# Patient Record
Sex: FEMALE | Race: WHITE | URBAN - METROPOLITAN AREA
[De-identification: names, ages, dates, MRNs, and addresses within clinical notes are randomized per-mention and may not be internally consistent; named-entity substitution may affect disease eponyms.]

---

## 2017-03-01 ENCOUNTER — OUTPATIENT (OUTPATIENT)
Dept: OUTPATIENT SERVICES | Facility: HOSPITAL | Age: 77
LOS: 1 days | Discharge: HOME | End: 2017-03-01
Payer: MEDICARE

## 2017-03-01 DIAGNOSIS — I47.1 SUPRAVENTRICULAR TACHYCARDIA: ICD-10-CM

## 2017-03-01 DIAGNOSIS — Z87.891 PERSONAL HISTORY OF NICOTINE DEPENDENCE: ICD-10-CM

## 2017-03-01 DIAGNOSIS — I48.0 PAROXYSMAL ATRIAL FIBRILLATION: ICD-10-CM

## 2017-03-01 DIAGNOSIS — M23.361 OTHER MENISCUS DERANGEMENTS, OTHER LATERAL MENISCUS, RIGHT KNEE: ICD-10-CM

## 2017-03-01 DIAGNOSIS — S82.141A DISPLACED BICONDYLAR FRACTURE OF RIGHT TIBIA, INITIAL ENCOUNTER FOR CLOSED FRACTURE: ICD-10-CM

## 2017-03-01 DIAGNOSIS — Z98.890 OTHER SPECIFIED POSTPROCEDURAL STATES: Chronic | ICD-10-CM

## 2017-03-01 DIAGNOSIS — M84.451A PATHOLOGICAL FRACTURE, RIGHT FEMUR, INITIAL ENCOUNTER FOR FRACTURE: ICD-10-CM

## 2017-03-01 DIAGNOSIS — E89.2 POSTPROCEDURAL HYPOPARATHYROIDISM: Chronic | ICD-10-CM

## 2017-03-01 DIAGNOSIS — S83.241A OTHER TEAR OF MEDIAL MENISCUS, CURRENT INJURY, RIGHT KNEE, INITIAL ENCOUNTER: ICD-10-CM

## 2017-03-01 DIAGNOSIS — M84.461A PATHOLOGICAL FRACTURE, RIGHT TIBIA, INITIAL ENCOUNTER FOR FRACTURE: ICD-10-CM

## 2017-03-01 DIAGNOSIS — Z90.49 ACQUIRED ABSENCE OF OTHER SPECIFIED PARTS OF DIGESTIVE TRACT: Chronic | ICD-10-CM

## 2017-03-01 DIAGNOSIS — Z01.818 ENCOUNTER FOR OTHER PREPROCEDURAL EXAMINATION: ICD-10-CM

## 2017-03-01 DIAGNOSIS — M23.321 OTHER MENISCUS DERANGEMENTS, POSTERIOR HORN OF MEDIAL MENISCUS, RIGHT KNEE: ICD-10-CM

## 2017-03-01 DIAGNOSIS — E89.0 POSTPROCEDURAL HYPOTHYROIDISM: Chronic | ICD-10-CM

## 2017-03-01 LAB
ANION GAP SERPL CALC-SCNC: 10 MMOL/L — SIGNIFICANT CHANGE UP (ref 5–17)
BASOPHILS # BLD AUTO: 0.1 K/UL — SIGNIFICANT CHANGE UP (ref 0–0.2)
BASOPHILS NFR BLD AUTO: 1.9 % — SIGNIFICANT CHANGE UP (ref 0–2)
BUN SERPL-MCNC: 22 MG/DL — SIGNIFICANT CHANGE UP (ref 7–23)
CALCIUM SERPL-MCNC: 8.4 MG/DL — LOW (ref 8.5–10.1)
CHLORIDE SERPL-SCNC: 108 MMOL/L — SIGNIFICANT CHANGE UP (ref 96–108)
CO2 SERPL-SCNC: 25 MMOL/L — SIGNIFICANT CHANGE UP (ref 22–31)
CREAT SERPL-MCNC: 1 MG/DL — SIGNIFICANT CHANGE UP (ref 0.5–1.3)
EOSINOPHIL # BLD AUTO: 0.4 K/UL — SIGNIFICANT CHANGE UP (ref 0–0.5)
EOSINOPHIL NFR BLD AUTO: 6.7 % — HIGH (ref 0–6)
GLUCOSE SERPL-MCNC: 93 MG/DL — SIGNIFICANT CHANGE UP (ref 70–99)
HCT VFR BLD CALC: 39.4 % — SIGNIFICANT CHANGE UP (ref 34.5–45)
HGB BLD-MCNC: 13.4 G/DL — SIGNIFICANT CHANGE UP (ref 11.5–15.5)
LYMPHOCYTES # BLD AUTO: 1.5 K/UL — SIGNIFICANT CHANGE UP (ref 1–3.3)
LYMPHOCYTES # BLD AUTO: 27.9 % — SIGNIFICANT CHANGE UP (ref 13–44)
MCHC RBC-ENTMCNC: 31.2 PG — SIGNIFICANT CHANGE UP (ref 27–34)
MCHC RBC-ENTMCNC: 34 GM/DL — SIGNIFICANT CHANGE UP (ref 32–36)
MCV RBC AUTO: 91.5 FL — SIGNIFICANT CHANGE UP (ref 80–100)
MONOCYTES # BLD AUTO: 0.5 K/UL — SIGNIFICANT CHANGE UP (ref 0–0.9)
MONOCYTES NFR BLD AUTO: 9.6 % — SIGNIFICANT CHANGE UP (ref 2–14)
NEUTROPHILS # BLD AUTO: 3 K/UL — SIGNIFICANT CHANGE UP (ref 1.8–7.4)
NEUTROPHILS NFR BLD AUTO: 53.9 % — SIGNIFICANT CHANGE UP (ref 43–77)
PLATELET # BLD AUTO: 242 K/UL — SIGNIFICANT CHANGE UP (ref 150–400)
POTASSIUM SERPL-MCNC: 4.6 MMOL/L — SIGNIFICANT CHANGE UP (ref 3.5–5.3)
POTASSIUM SERPL-SCNC: 4.6 MMOL/L — SIGNIFICANT CHANGE UP (ref 3.5–5.3)
RBC # BLD: 4.31 M/UL — SIGNIFICANT CHANGE UP (ref 3.8–5.2)
RBC # FLD: 11.5 % — SIGNIFICANT CHANGE UP (ref 10.3–14.5)
SODIUM SERPL-SCNC: 143 MMOL/L — SIGNIFICANT CHANGE UP (ref 135–145)
WBC # BLD: 5.5 K/UL — SIGNIFICANT CHANGE UP (ref 3.8–10.5)
WBC # FLD AUTO: 5.5 K/UL — SIGNIFICANT CHANGE UP (ref 3.8–10.5)

## 2017-03-01 PROCEDURE — 93010 ELECTROCARDIOGRAM REPORT: CPT

## 2017-03-01 NOTE — ASU PATIENT PROFILE, ADULT - ABILITY TO HEAR (WITH HEARING AID OR HEARING APPLIANCE IF NORMALLY USED):
hearing loss left ear-does not use assistive device./Mildly to Moderately Impaired: difficulty hearing in some environments or speaker may need to increase volume or speak distinctly

## 2017-03-01 NOTE — ASU PATIENT PROFILE, ADULT - VISION (WITH CORRECTIVE LENSES IF THE PATIENT USUALLY WEARS THEM):
Partially impaired: cannot see medication labels or newsprint, but can see obstacles in path, and the surrounding layout; can count fingers at arm's length/reading glasses and far sighted

## 2017-03-01 NOTE — ASU PATIENT PROFILE, ADULT - PSH
S/P arthroscopy of left knee  2016  S/P cholecystectomy  2015  S/P colonoscopy  unsure of all dates; last one 2016  S/P parathyroidectomy  12/2005  S/P thyroidectomy  12/2005

## 2017-03-01 NOTE — CHART NOTE - NSCHARTNOTEFT_GEN_A_CORE
Patient seen in PST encounter today:    V/S: T-98.0, P-67, R-14, B/P-142/75, o2sat 99% on room air.    Ht: 5'2", Wt: 69.1kgs    EZ sponges, holistic sheet, and day of procedure instructions provided and reviewed with patient.

## 2017-03-01 NOTE — ASU PATIENT PROFILE, ADULT - PMH
Cardiac arrhythmia  H/O: bigemeny and trigemeny  Fracture of right lower extremity  condyle and tibial plateau fracture  Hearing loss of right ear, unspecified hearing loss type    Hypothyroidism, acquired  s/p thyroidectomy  Knee pain, right    Osteoporosis    SVT (supraventricular tachycardia)  H/O  Thyroid cancer  s/p thyroidectomy

## 2017-03-09 ENCOUNTER — OUTPATIENT (OUTPATIENT)
Dept: OUTPATIENT SERVICES | Facility: HOSPITAL | Age: 77
LOS: 1 days | Discharge: HOME | End: 2017-03-09
Payer: MEDICARE

## 2017-03-09 VITALS
HEART RATE: 64 BPM | WEIGHT: 148.37 LBS | RESPIRATION RATE: 16 BRPM | TEMPERATURE: 98 F | SYSTOLIC BLOOD PRESSURE: 130 MMHG | HEIGHT: 62 IN | DIASTOLIC BLOOD PRESSURE: 75 MMHG | OXYGEN SATURATION: 97 %

## 2017-03-09 VITALS
DIASTOLIC BLOOD PRESSURE: 70 MMHG | RESPIRATION RATE: 16 BRPM | HEART RATE: 70 BPM | SYSTOLIC BLOOD PRESSURE: 143 MMHG | OXYGEN SATURATION: 95 %

## 2017-03-09 DIAGNOSIS — Z98.890 OTHER SPECIFIED POSTPROCEDURAL STATES: Chronic | ICD-10-CM

## 2017-03-09 DIAGNOSIS — Z90.49 ACQUIRED ABSENCE OF OTHER SPECIFIED PARTS OF DIGESTIVE TRACT: Chronic | ICD-10-CM

## 2017-03-09 DIAGNOSIS — E89.0 POSTPROCEDURAL HYPOTHYROIDISM: Chronic | ICD-10-CM

## 2017-03-09 DIAGNOSIS — E89.2 POSTPROCEDURAL HYPOPARATHYROIDISM: Chronic | ICD-10-CM

## 2017-03-09 PROCEDURE — 88304 TISSUE EXAM BY PATHOLOGIST: CPT | Mod: 26

## 2017-03-09 RX ORDER — IBUPROFEN 200 MG
400 TABLET ORAL EVERY 8 HOURS
Qty: 0 | Refills: 0 | Status: DISCONTINUED | OUTPATIENT
Start: 2017-03-09 | End: 2017-03-24

## 2017-03-09 RX ORDER — SODIUM CHLORIDE 9 MG/ML
1000 INJECTION INTRAMUSCULAR; INTRAVENOUS; SUBCUTANEOUS
Qty: 0 | Refills: 0 | Status: DISCONTINUED | OUTPATIENT
Start: 2017-03-09 | End: 2017-03-09

## 2017-03-09 RX ORDER — HYDROMORPHONE HYDROCHLORIDE 2 MG/ML
1 INJECTION INTRAMUSCULAR; INTRAVENOUS; SUBCUTANEOUS
Qty: 0 | Refills: 0 | Status: DISCONTINUED | OUTPATIENT
Start: 2017-03-09 | End: 2017-03-09

## 2017-03-09 RX ORDER — MORPHINE SULFATE 50 MG/1
4 CAPSULE, EXTENDED RELEASE ORAL EVERY 4 HOURS
Qty: 0 | Refills: 0 | Status: DISCONTINUED | OUTPATIENT
Start: 2017-03-09 | End: 2017-03-09

## 2017-03-09 RX ORDER — ASPIRIN/CALCIUM CARB/MAGNESIUM 324 MG
1 TABLET ORAL
Qty: 14 | Refills: 0
Start: 2017-03-09 | End: 2017-03-16

## 2017-03-09 RX ORDER — TOBRAMYCIN 0.3 %
1 DROPS OPHTHALMIC (EYE) EVERY 4 HOURS
Qty: 0 | Refills: 0 | Status: DISCONTINUED | OUTPATIENT
Start: 2017-03-09 | End: 2017-03-24

## 2017-03-09 RX ORDER — ONDANSETRON 8 MG/1
4 TABLET, FILM COATED ORAL EVERY 6 HOURS
Qty: 0 | Refills: 0 | Status: DISCONTINUED | OUTPATIENT
Start: 2017-03-09 | End: 2017-03-24

## 2017-03-09 RX ORDER — ACETAMINOPHEN 500 MG
1000 TABLET ORAL ONCE
Qty: 0 | Refills: 0 | Status: COMPLETED | OUTPATIENT
Start: 2017-03-09 | End: 2017-03-09

## 2017-03-09 RX ORDER — OXYCODONE HYDROCHLORIDE 5 MG/1
5 TABLET ORAL EVERY 6 HOURS
Qty: 0 | Refills: 0 | Status: DISCONTINUED | OUTPATIENT
Start: 2017-03-09 | End: 2017-03-09

## 2017-03-09 RX ADMIN — SODIUM CHLORIDE 75 MILLILITER(S): 9 INJECTION INTRAMUSCULAR; INTRAVENOUS; SUBCUTANEOUS at 11:46

## 2017-03-09 RX ADMIN — Medication 1 APPLICATION(S): at 13:45

## 2017-03-09 RX ADMIN — HYDROMORPHONE HYDROCHLORIDE 1 MILLIGRAM(S): 2 INJECTION INTRAMUSCULAR; INTRAVENOUS; SUBCUTANEOUS at 12:22

## 2017-03-09 RX ADMIN — Medication 400 MILLIGRAM(S): at 11:46

## 2017-03-09 NOTE — ASU DISCHARGE PLAN (ADULT/PEDIATRIC). - SPECIAL INSTRUCTIONS
please call office for follow up appointment; please rest ice and elevate affected knee; WBAT; keep dressing clean dry intact; please call office for follow up appointment; please rest ice and elevate affected knee; WBAT; keep dressing clean dry intact; Take Aspirin 325mg daily for 7 days for DVT PPx.

## 2017-03-09 NOTE — ASU PATIENT PROFILE, ADULT - PMH
Cardiac arrhythmia  H/O: bigemeny and trigemeny  Fracture of right lower extremity  condyle and tibial plateau fracture  Hearing loss of right ear, unspecified hearing loss type    Hypothyroidism, acquired  s/p thyroidectomy  Knee pain, right    Osteoporosis    Pancreatitis  2015  SVT (supraventricular tachycardia)  H/O  Thyroid cancer  s/p thyroidectomy

## 2017-03-09 NOTE — BRIEF OPERATIVE NOTE - PROCEDURE
Subchondroplasty of knee  03/09/2017    Active  ADELIA  Knee arthroscopy, right  03/09/2017    Active  ADELIA

## 2017-03-09 NOTE — ASU DISCHARGE PLAN (ADULT/PEDIATRIC). - NURSING INSTRUCTIONS
Begin with liquids and light food ( tea, toast, Jello, soups). Advance to what you normally eat. Liquids should taken in adequate amounts today.    CALL the DOCTOR:  Fever above 101F                                      Signs  of infection such as : increase pain,swelling,redness,or a bad  smell coming from                                        the wound.                                       Any pain that appears to be getting worse.                                       Vomiting                                        If you have  not urinated 8 hours after surgery or have any difficulty urinating.     A responsible adult should be with you for the rest of the day and night for your safety and to help you if you needed.    Review attached FACT SHEET if applicable.

## 2017-03-09 NOTE — ASU DISCHARGE PLAN (ADULT/PEDIATRIC). - MEDICATION SUMMARY - MEDICATIONS TO TAKE
I will START or STAY ON the medications listed below when I get home from the hospital:    aspirin 81 mg oral delayed release tablet  -- 1 tab(s) by mouth once a day  -- pt instructed to follow Dr. Tran or Dr. Philip pre-procedure instructions.  -- Indication: For home med/DVT PPx    Percocet 5/325 325 mg-5 mg oral tablet  -- 1 tab(s) by mouth every 4 hours, As Needed MDD:6 tabs for pain  -- Caution federal law prohibits the transfer of this drug to any person other  than the person for whom it was prescribed.  May cause drowsiness.  Alcohol may intensify this effect.  Use care when operating dangerous machinery.  This prescription cannot be refilled.  This product contains acetaminophen.  Do not use  with any other product containing acetaminophen to prevent possible liver damage.  Using more of this medication than prescribed may cause serious breathing problems.    -- Indication: For Pain    verapamil 120 mg/24 hours oral capsule, extended release  -- 1 cap(s) by mouth once a day  -- Indication: For home med    Evista 60 mg oral tablet  -- 1 tab(s) by mouth once a day  -- Indication: For home med    Synthroid 100 mcg (0.1 mg) oral tablet  -- 1 tab(s) by mouth once a day  -- Indication: For home med    Vitamin D3 2000 intl units oral capsule  -- 1 cap(s) by mouth once a day  -- pt instructed to stop 1 week prior to procedure.  -- Indication: For home med

## 2017-03-09 NOTE — ASU DISCHARGE PLAN (ADULT/PEDIATRIC). - NOTIFY
Bleeding that does not stop/Fever greater than 101/Numbness, color, or temperature change to extremity/Persistent Nausea and Vomiting/Pain not relieved by Medications/Swelling that continues

## 2017-03-09 NOTE — PROGRESS NOTE ADULT - ASSESSMENT
Pt with possible corneal abrassion. Tetracaine one drop given in right eye with immediate relief. Pt ordered for Tobramycin. Anesthesia team informed.

## 2017-03-13 DIAGNOSIS — M84.461A PATHOLOGICAL FRACTURE, RIGHT TIBIA, INITIAL ENCOUNTER FOR FRACTURE: ICD-10-CM

## 2017-03-13 DIAGNOSIS — Z85.850 PERSONAL HISTORY OF MALIGNANT NEOPLASM OF THYROID: ICD-10-CM

## 2017-03-13 DIAGNOSIS — Z87.891 PERSONAL HISTORY OF NICOTINE DEPENDENCE: ICD-10-CM

## 2017-03-13 DIAGNOSIS — I48.0 PAROXYSMAL ATRIAL FIBRILLATION: ICD-10-CM

## 2017-03-13 DIAGNOSIS — Z79.82 LONG TERM (CURRENT) USE OF ASPIRIN: ICD-10-CM

## 2017-03-13 DIAGNOSIS — M23.361 OTHER MENISCUS DERANGEMENTS, OTHER LATERAL MENISCUS, RIGHT KNEE: ICD-10-CM

## 2017-03-13 DIAGNOSIS — M84.451A PATHOLOGICAL FRACTURE, RIGHT FEMUR, INITIAL ENCOUNTER FOR FRACTURE: ICD-10-CM

## 2017-03-13 DIAGNOSIS — M23.321 OTHER MENISCUS DERANGEMENTS, POSTERIOR HORN OF MEDIAL MENISCUS, RIGHT KNEE: ICD-10-CM

## 2017-03-13 DIAGNOSIS — I35.1 NONRHEUMATIC AORTIC (VALVE) INSUFFICIENCY: ICD-10-CM

## 2017-03-13 DIAGNOSIS — Z86.718 PERSONAL HISTORY OF OTHER VENOUS THROMBOSIS AND EMBOLISM: ICD-10-CM

## 2017-03-13 DIAGNOSIS — I47.1 SUPRAVENTRICULAR TACHYCARDIA: ICD-10-CM

## 2018-07-16 ENCOUNTER — OUTPATIENT (OUTPATIENT)
Dept: OUTPATIENT SERVICES | Facility: HOSPITAL | Age: 78
LOS: 1 days | Discharge: HOME | End: 2018-07-16

## 2018-07-16 DIAGNOSIS — E89.2 POSTPROCEDURAL HYPOPARATHYROIDISM: Chronic | ICD-10-CM

## 2018-07-16 DIAGNOSIS — C50.919 MALIGNANT NEOPLASM OF UNSPECIFIED SITE OF UNSPECIFIED FEMALE BREAST: ICD-10-CM

## 2018-07-16 DIAGNOSIS — E89.0 POSTPROCEDURAL HYPOTHYROIDISM: Chronic | ICD-10-CM

## 2018-07-16 DIAGNOSIS — Z98.890 OTHER SPECIFIED POSTPROCEDURAL STATES: Chronic | ICD-10-CM

## 2018-07-16 DIAGNOSIS — Z90.49 ACQUIRED ABSENCE OF OTHER SPECIFIED PARTS OF DIGESTIVE TRACT: Chronic | ICD-10-CM

## 2018-07-16 PROBLEM — K85.90 ACUTE PANCREATITIS WITHOUT NECROSIS OR INFECTION, UNSPECIFIED: Chronic | Status: ACTIVE | Noted: 2017-03-09

## 2018-07-16 PROBLEM — E03.9 HYPOTHYROIDISM, UNSPECIFIED: Chronic | Status: ACTIVE | Noted: 2017-03-01

## 2018-07-16 PROBLEM — I49.9 CARDIAC ARRHYTHMIA, UNSPECIFIED: Chronic | Status: ACTIVE | Noted: 2017-03-01

## 2018-07-16 PROBLEM — M81.0 AGE-RELATED OSTEOPOROSIS WITHOUT CURRENT PATHOLOGICAL FRACTURE: Chronic | Status: ACTIVE | Noted: 2017-03-01

## 2018-07-16 PROBLEM — C73 MALIGNANT NEOPLASM OF THYROID GLAND: Chronic | Status: ACTIVE | Noted: 2017-03-01

## 2018-07-16 PROBLEM — M25.561 PAIN IN RIGHT KNEE: Chronic | Status: ACTIVE | Noted: 2017-03-01

## 2018-07-16 PROBLEM — H91.91 UNSPECIFIED HEARING LOSS, RIGHT EAR: Chronic | Status: ACTIVE | Noted: 2017-03-01

## 2018-07-16 PROBLEM — I47.1 SUPRAVENTRICULAR TACHYCARDIA: Chronic | Status: ACTIVE | Noted: 2017-03-01

## 2018-07-16 PROBLEM — S82.91XA UNSPECIFIED FRACTURE OF RIGHT LOWER LEG, INITIAL ENCOUNTER FOR CLOSED FRACTURE: Chronic | Status: ACTIVE | Noted: 2017-03-01

## 2018-09-06 ENCOUNTER — OUTPATIENT (OUTPATIENT)
Dept: OUTPATIENT SERVICES | Facility: HOSPITAL | Age: 78
LOS: 1 days | Discharge: HOME | End: 2018-09-06

## 2018-09-06 DIAGNOSIS — E89.2 POSTPROCEDURAL HYPOPARATHYROIDISM: Chronic | ICD-10-CM

## 2018-09-06 DIAGNOSIS — Z98.890 OTHER SPECIFIED POSTPROCEDURAL STATES: Chronic | ICD-10-CM

## 2018-09-06 DIAGNOSIS — C50.919 MALIGNANT NEOPLASM OF UNSPECIFIED SITE OF UNSPECIFIED FEMALE BREAST: ICD-10-CM

## 2018-09-06 DIAGNOSIS — Z90.49 ACQUIRED ABSENCE OF OTHER SPECIFIED PARTS OF DIGESTIVE TRACT: Chronic | ICD-10-CM

## 2018-09-06 DIAGNOSIS — E89.0 POSTPROCEDURAL HYPOTHYROIDISM: Chronic | ICD-10-CM

## 2019-06-02 ENCOUNTER — EMERGENCY (EMERGENCY)
Facility: HOSPITAL | Age: 79
LOS: 0 days | Discharge: HOME | End: 2019-06-02
Attending: EMERGENCY MEDICINE | Admitting: EMERGENCY MEDICINE
Payer: MEDICARE

## 2019-06-02 VITALS
OXYGEN SATURATION: 100 % | HEART RATE: 60 BPM | SYSTOLIC BLOOD PRESSURE: 153 MMHG | RESPIRATION RATE: 18 BRPM | DIASTOLIC BLOOD PRESSURE: 65 MMHG | TEMPERATURE: 98 F

## 2019-06-02 VITALS — HEIGHT: 62 IN | WEIGHT: 147.93 LBS

## 2019-06-02 DIAGNOSIS — Z98.890 OTHER SPECIFIED POSTPROCEDURAL STATES: Chronic | ICD-10-CM
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
## 2019-06-02 DIAGNOSIS — Z90.49 ACQUIRED ABSENCE OF OTHER SPECIFIED PARTS OF DIGESTIVE TRACT: Chronic | ICD-10-CM

## 2019-06-02 DIAGNOSIS — K85.90 ACUTE PANCREATITIS WITHOUT NECROSIS OR INFECTION, UNSPECIFIED: ICD-10-CM

## 2019-06-02 DIAGNOSIS — E89.0 POSTPROCEDURAL HYPOTHYROIDISM: Chronic | ICD-10-CM

## 2019-06-02 DIAGNOSIS — S52.92XA UNSPECIFIED FRACTURE OF LEFT FOREARM, INITIAL ENCOUNTER FOR CLOSED FRACTURE: ICD-10-CM
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
## 2019-06-02 DIAGNOSIS — M81.0 AGE-RELATED OSTEOPOROSIS WITHOUT CURRENT PATHOLOGICAL FRACTURE: ICD-10-CM

## 2019-06-02 DIAGNOSIS — Z79.899 OTHER LONG TERM (CURRENT) DRUG THERAPY: ICD-10-CM

## 2019-06-02 DIAGNOSIS — Z79.82 LONG TERM (CURRENT) USE OF ASPIRIN: ICD-10-CM
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
## 2019-06-02 DIAGNOSIS — S52.202A UNSPECIFIED FRACTURE OF SHAFT OF LEFT ULNA, INITIAL ENCOUNTER FOR CLOSED FRACTURE: ICD-10-CM

## 2019-06-02 DIAGNOSIS — E89.2 POSTPROCEDURAL HYPOPARATHYROIDISM: Chronic | ICD-10-CM
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
## 2019-06-02 DIAGNOSIS — M25.532 PAIN IN LEFT WRIST: ICD-10-CM

## 2019-06-02 PROCEDURE — 73130 X-RAY EXAM OF HAND: CPT | Mod: 26,LT

## 2019-06-02 PROCEDURE — 99283 EMERGENCY DEPT VISIT LOW MDM: CPT | Mod: 25

## 2019-06-02 PROCEDURE — 73110 X-RAY EXAM OF WRIST: CPT | Mod: 26,LT

## 2019-06-02 PROCEDURE — 29125 APPL SHORT ARM SPLINT STATIC: CPT | Mod: LT

## 2019-06-02 RX ORDER — ACETAMINOPHEN 500 MG
1000 TABLET ORAL ONCE
Refills: 0 | Status: COMPLETED | OUTPATIENT
Start: 2019-06-02 | End: 2019-06-02

## 2019-06-02 RX ADMIN — Medication 1000 MILLIGRAM(S): at 10:31

## 2019-06-02 NOTE — ED ADULT NURSE NOTE - NSIMPLEMENTINTERV_GEN_ALL_ED
Implemented All Universal Safety Interventions:  [place] to call system. Call bell, personal items and telephone within reach. Instruct patient to call for assistance. Room bathroom lighting operational. Non-slip footwear when patient is off stretcher. Physically safe environment: no spills, clutter or unnecessary equipment. Stretcher in lowest position, wheels locked, appropriate side rails in place.

## 2019-06-02 NOTE — ED PROVIDER NOTE - OBJECTIVE STATEMENT
77 y/o female with PMHx of pancreatitis, thyroid CA s/p thyroidectomy, RLE fx, osteoporosis, cardiac arrhythmia, SVT, hypothyroidism, s/p arthroscopy left knee, s/p cholecystectomy presents to the ED s/p mechanical fall this AM. Pt reports she was reaching down to pick strawberries from yard, fell down one step and landed on her left side. No LOC, +hit left frontal head. Now c/o left wrist/hand pain. Took Ibuprofen PTA. On ASA, Synthroid, Verapamil. PMD: Jose Maria.

## 2019-06-02 NOTE — ED ADULT TRIAGE NOTE - CHIEF COMPLAINT QUOTE
pt reports a fall down one step and landed on concrete on left side of body while reaching down to get strawberries from yard, hit left frontal head and left wrist, c/o pain to left wrist, bruise noted to left frontal head, denies LOC, pt takes aspirin daily

## 2019-06-02 NOTE — ED PROVIDER NOTE - PROGRESS NOTE DETAILS
Meagan LONG for ED attending, Dr. Uriostegui: Ring cutter used, gold ring cut from left fourth digit, ring removed and given to , pt tolerated procedure well. Meagan LONG for ED attending, Dr. Uriostegui: XR shows distal radius dx. Dr. Stone paged for hand. Meagan LONG for ED attending, Dr. Uriostegui: Discussed with Dr. Stone who will come to see pt. Meagan LONG for ED attending, Dr. Uriostegui: Dr. Stone at bedside.

## 2019-06-02 NOTE — ED ADULT NURSE NOTE - OBJECTIVE STATEMENT
pt presents to ED from home, pt alert and orientedx4, VSS afebrule, pt c/o mechnical trip and fall, with left wrist pain and deformity, pt states she fell mostly onto left side, bumping head, small bump to left side of head pt refused CT of head, defomirty noted to left wrist with bruising and swelling. neuros intact. no LOC, pt on ASA at home, trauma alert canceled by Dr. Uriostegui, safety maintained neuros intact, will continue to monitor

## 2019-06-02 NOTE — ED PROVIDER NOTE - MUSCULOSKELETAL, MLM
Spine appears normal, range of motion is not limited, distal motor neurovascularly intact +small abrasion left forehead +deformity left wrist, +left wrist pain to palpation

## 2020-10-22 ENCOUNTER — APPOINTMENT (OUTPATIENT)
Dept: UROGYNECOLOGY | Facility: CLINIC | Age: 80
End: 2020-10-22
Payer: MEDICARE

## 2020-10-22 VITALS
SYSTOLIC BLOOD PRESSURE: 140 MMHG | DIASTOLIC BLOOD PRESSURE: 76 MMHG | BODY MASS INDEX: 27.6 KG/M2 | HEIGHT: 62 IN | WEIGHT: 150 LBS

## 2020-10-22 DIAGNOSIS — Z82.3 FAMILY HISTORY OF STROKE: ICD-10-CM

## 2020-10-22 DIAGNOSIS — Z87.39 PERSONAL HISTORY OF OTHER DISEASES OF THE MUSCULOSKELETAL SYSTEM AND CONNECTIVE TISSUE: ICD-10-CM

## 2020-10-22 DIAGNOSIS — Z86.39 PERSONAL HISTORY OF OTHER ENDOCRINE, NUTRITIONAL AND METABOLIC DISEASE: ICD-10-CM

## 2020-10-22 DIAGNOSIS — Z87.891 PERSONAL HISTORY OF NICOTINE DEPENDENCE: ICD-10-CM

## 2020-10-22 PROBLEM — Z00.00 ENCOUNTER FOR PREVENTIVE HEALTH EXAMINATION: Status: ACTIVE | Noted: 2020-10-22

## 2020-10-22 LAB
BILIRUB UR QL STRIP: NEGATIVE
CLARITY UR: CLEAR
COLLECTION METHOD: NORMAL
GLUCOSE UR-MCNC: NEGATIVE
HCG UR QL: 0.2 EU/DL
HGB UR QL STRIP.AUTO: NEGATIVE
KETONES UR-MCNC: NEGATIVE
LEUKOCYTE ESTERASE UR QL STRIP: NEGATIVE
NITRITE UR QL STRIP: NEGATIVE
PH UR STRIP: 5.5
PROT UR STRIP-MCNC: NEGATIVE
SP GR UR STRIP: 1.01

## 2020-10-22 PROCEDURE — 99204 OFFICE O/P NEW MOD 45 MIN: CPT | Mod: 25

## 2020-10-22 PROCEDURE — 51701 INSERT BLADDER CATHETER: CPT

## 2020-10-22 PROCEDURE — 81003 URINALYSIS AUTO W/O SCOPE: CPT | Mod: QW

## 2020-10-22 RX ORDER — DENOSUMAB 60 MG/ML
INJECTION SUBCUTANEOUS
Refills: 0 | Status: ACTIVE | COMMUNITY

## 2020-10-22 RX ORDER — LEVOTHYROXINE SODIUM 100 UG/1
100 TABLET ORAL
Refills: 0 | Status: ACTIVE | COMMUNITY

## 2020-10-22 NOTE — LETTER BODY
[Dear  ___] : Dear ~ALIA, [I had the pleasure of evaluating your patient, [unfilled]. Thank you for referring Ms. [unfilled] for consultation for ___] : I had the pleasure of evaluating your patient, [unfilled]. Thank you for referring Ms. [unfilled] for consultation for [unfilled]. [Attached please find my note.] : Attached please find my note. [Thank you very much for allowing me to participate in the care of this patient. If you have any questions, please do not hesitate to contact me] : Thank you very much for allowing me to participate in the care of this patient. If you have any questions, please do not hesitate to contact me. [DrDelia  ___] : Dr. HEBERT

## 2020-10-22 NOTE — HISTORY OF PRESENT ILLNESS
[FreeTextEntry1] : 80  female with complaints of prolapse. Noticed about a year ago. No pain but it is annoying to her. Sometimes feels she does not empty her bladder fully. Leaks with urge and with activity sometimes. Wears pads and has to change 1 or 2 times per day. Does not feel she voids frequently. She does have occasional urgency. Gets up 1-2 times at night. No bowel complaints. Is aware of a pessary but has not tried one. Last UTI was in September. The one prior was a year prior.

## 2020-10-22 NOTE — PHYSICAL EXAM
[Chaperone Present] : A chaperone was present in the examining room during all aspects of the physical examination [No Acute Distress] : in no acute distress [Well developed] : well developed [Well Nourished] : ~L well nourished [Oriented x3] : oriented to person, place, and time [No Edema] : ~T edema was not present [Warm and Dry] : was warm and dry to touch [Normal Gait] : gait was normal [Normal Appearance] : general appearance was normal [Atrophy] : atrophy [2] : 2 [Aa ____] : Aa [unfilled] [Ba ____] : Ba [unfilled] [C ____] : C [unfilled] [GH ____] : GH [unfilled] [PB ____] : PB [unfilled] [TVL ____] : TVL  [unfilled] [Ap ____] : Ap [unfilled] [Bp ____] : Bp [unfilled] [D ____] : D [unfilled] [Normal] : no abnormalities [Post Void Residual ____ml] : post void residual was [unfilled] ml [Cough] : no cough [Tenderness] : ~T no ~M abdominal tenderness observed [Distended] : not distended [Hernia] : no hernia observed [Scar] : no scars

## 2020-10-22 NOTE — OB HISTORY
[Vaginal ___] : [unfilled] vaginal delivery(s) [Definite ___ (Date)] : the last menstrual period was [unfilled] [Last Pap Smear ___] : date of last pap smear was on [unfilled] [Abnormal Pap Smear] : normal pap smear [Taking Estrogens] : is not taking estrogen replacement [Sexually Active] : is not sexually active [FreeTextEntry1] : Largest baby 8#10oz.

## 2020-10-22 NOTE — DISCUSSION/SUMMARY
[FreeTextEntry1] : Ms. NARAYAN has significant anterior apical prolapse, urgency, occasional incontinence, elevated PVR. We talked about the etiology and natural progression of pelvic organ prolapse. We discussed the treatment options of a pessary, physical therapy or surgery. In terms of surgery we talked about open procedures, robotic assisted procedures and vaginal reconstruction with or without the utilization of graft material. I recommended bladder testing UDTs and a pelvic U/S. I gave her some literature on pelvic floor muscle strengthening and SCP. We discussed the risk of leaking the potential for worsening CHIVO and that is why I want to do UDTs. She would like to have surgery and I think she would be a good candidate for a robotic SCP. I was able to answer all of her questions.\par

## 2020-11-02 ENCOUNTER — APPOINTMENT (OUTPATIENT)
Dept: UROGYNECOLOGY | Facility: CLINIC | Age: 80
End: 2020-11-02

## 2020-11-09 ENCOUNTER — APPOINTMENT (OUTPATIENT)
Dept: UROGYNECOLOGY | Facility: CLINIC | Age: 80
End: 2020-11-09
Payer: MEDICARE

## 2020-11-09 VITALS — TEMPERATURE: 98.3 F

## 2020-11-09 PROCEDURE — 51741 ELECTRO-UROFLOWMETRY FIRST: CPT

## 2020-11-09 PROCEDURE — 51729 CYSTOMETROGRAM W/VP&UP: CPT

## 2020-11-09 PROCEDURE — 51797 INTRAABDOMINAL PRESSURE TEST: CPT

## 2020-11-09 PROCEDURE — 51784 ANAL/URINARY MUSCLE STUDY: CPT

## 2020-11-10 ENCOUNTER — APPOINTMENT (OUTPATIENT)
Dept: UROGYNECOLOGY | Facility: CLINIC | Age: 80
End: 2020-11-10
Payer: MEDICARE

## 2020-11-10 PROCEDURE — 99214 OFFICE O/P EST MOD 30 MIN: CPT

## 2020-11-10 NOTE — HISTORY OF PRESENT ILLNESS
[FreeTextEntry1] : Here to discuss options. We reviewed UDTs that showed CHIVO and small capacity. We discussed her U/S that showed trace fluid in endometrial cavity.

## 2020-11-10 NOTE — LETTER BODY
[Dear  ___] : Dear ~ALIA, [Attached please find my note.] : Attached please find my note. [Thank you very much for allowing me to participate in the care of this patient. If you have any questions, please do not hesitate to contact me] : Thank you very much for allowing me to participate in the care of this patient. If you have any questions, please do not hesitate to contact me. [DrDelia  ___] : Dr. HEBERT

## 2020-11-13 ENCOUNTER — OUTPATIENT (OUTPATIENT)
Dept: OUTPATIENT SERVICES | Facility: HOSPITAL | Age: 80
LOS: 1 days | End: 2020-11-13
Payer: MEDICARE

## 2020-11-13 ENCOUNTER — RESULT REVIEW (OUTPATIENT)
Age: 80
End: 2020-11-13

## 2020-11-13 VITALS
SYSTOLIC BLOOD PRESSURE: 144 MMHG | TEMPERATURE: 98 F | HEIGHT: 62 IN | HEART RATE: 66 BPM | WEIGHT: 147.05 LBS | RESPIRATION RATE: 16 BRPM | DIASTOLIC BLOOD PRESSURE: 58 MMHG

## 2020-11-13 DIAGNOSIS — E89.0 POSTPROCEDURAL HYPOTHYROIDISM: Chronic | ICD-10-CM

## 2020-11-13 DIAGNOSIS — I49.9 CARDIAC ARRHYTHMIA, UNSPECIFIED: ICD-10-CM

## 2020-11-13 DIAGNOSIS — Z98.890 OTHER SPECIFIED POSTPROCEDURAL STATES: Chronic | ICD-10-CM

## 2020-11-13 DIAGNOSIS — Z90.49 ACQUIRED ABSENCE OF OTHER SPECIFIED PARTS OF DIGESTIVE TRACT: Chronic | ICD-10-CM

## 2020-11-13 DIAGNOSIS — E03.9 HYPOTHYROIDISM, UNSPECIFIED: ICD-10-CM

## 2020-11-13 DIAGNOSIS — N81.10 CYSTOCELE, UNSPECIFIED: ICD-10-CM

## 2020-11-13 DIAGNOSIS — E89.2 POSTPROCEDURAL HYPOPARATHYROIDISM: Chronic | ICD-10-CM

## 2020-11-13 DIAGNOSIS — N39.3 STRESS INCONTINENCE (FEMALE) (MALE): ICD-10-CM

## 2020-11-13 DIAGNOSIS — N99.3 PROLAPSE OF VAGINAL VAULT AFTER HYSTERECTOMY: ICD-10-CM

## 2020-11-13 LAB
A1C WITH ESTIMATED AVERAGE GLUCOSE RESULT: 5.4 % — SIGNIFICANT CHANGE UP (ref 4–5.6)
ANION GAP SERPL CALC-SCNC: 4 MMOL/L — LOW (ref 5–17)
APTT BLD: 31.2 SEC — SIGNIFICANT CHANGE UP (ref 27.5–35.5)
BASOPHILS # BLD AUTO: 0.06 K/UL — SIGNIFICANT CHANGE UP (ref 0–0.2)
BASOPHILS NFR BLD AUTO: 1.1 % — SIGNIFICANT CHANGE UP (ref 0–2)
BUN SERPL-MCNC: 24 MG/DL — HIGH (ref 7–23)
CALCIUM SERPL-MCNC: 9 MG/DL — SIGNIFICANT CHANGE UP (ref 8.5–10.1)
CHLORIDE SERPL-SCNC: 111 MMOL/L — HIGH (ref 96–108)
CO2 SERPL-SCNC: 24 MMOL/L — SIGNIFICANT CHANGE UP (ref 22–31)
CREAT SERPL-MCNC: 1.09 MG/DL — SIGNIFICANT CHANGE UP (ref 0.5–1.3)
EOSINOPHIL # BLD AUTO: 0.35 K/UL — SIGNIFICANT CHANGE UP (ref 0–0.5)
EOSINOPHIL NFR BLD AUTO: 6.4 % — HIGH (ref 0–6)
ESTIMATED AVERAGE GLUCOSE: 108 MG/DL — SIGNIFICANT CHANGE UP (ref 68–114)
GLUCOSE SERPL-MCNC: 94 MG/DL — SIGNIFICANT CHANGE UP (ref 70–99)
HCT VFR BLD CALC: 41.8 % — SIGNIFICANT CHANGE UP (ref 34.5–45)
HGB BLD-MCNC: 13.8 G/DL — SIGNIFICANT CHANGE UP (ref 11.5–15.5)
IMM GRANULOCYTES NFR BLD AUTO: 0.2 % — SIGNIFICANT CHANGE UP (ref 0–1.5)
INR BLD: 1 RATIO — SIGNIFICANT CHANGE UP (ref 0.88–1.16)
LYMPHOCYTES # BLD AUTO: 1.32 K/UL — SIGNIFICANT CHANGE UP (ref 1–3.3)
LYMPHOCYTES # BLD AUTO: 24.3 % — SIGNIFICANT CHANGE UP (ref 13–44)
MCHC RBC-ENTMCNC: 30 PG — SIGNIFICANT CHANGE UP (ref 27–34)
MCHC RBC-ENTMCNC: 33 GM/DL — SIGNIFICANT CHANGE UP (ref 32–36)
MCV RBC AUTO: 90.9 FL — SIGNIFICANT CHANGE UP (ref 80–100)
MONOCYTES # BLD AUTO: 0.5 K/UL — SIGNIFICANT CHANGE UP (ref 0–0.9)
MONOCYTES NFR BLD AUTO: 9.2 % — SIGNIFICANT CHANGE UP (ref 2–14)
NEUTROPHILS # BLD AUTO: 3.2 K/UL — SIGNIFICANT CHANGE UP (ref 1.8–7.4)
NEUTROPHILS NFR BLD AUTO: 58.8 % — SIGNIFICANT CHANGE UP (ref 43–77)
PLATELET # BLD AUTO: 213 K/UL — SIGNIFICANT CHANGE UP (ref 150–400)
POTASSIUM SERPL-MCNC: 4.6 MMOL/L — SIGNIFICANT CHANGE UP (ref 3.5–5.3)
POTASSIUM SERPL-SCNC: 4.6 MMOL/L — SIGNIFICANT CHANGE UP (ref 3.5–5.3)
PROTHROM AB SERPL-ACNC: 11.7 SEC — SIGNIFICANT CHANGE UP (ref 10.6–13.6)
RBC # BLD: 4.6 M/UL — SIGNIFICANT CHANGE UP (ref 3.8–5.2)
RBC # FLD: 12.5 % — SIGNIFICANT CHANGE UP (ref 10.3–14.5)
SODIUM SERPL-SCNC: 139 MMOL/L — SIGNIFICANT CHANGE UP (ref 135–145)
WBC # BLD: 5.44 K/UL — SIGNIFICANT CHANGE UP (ref 3.8–10.5)
WBC # FLD AUTO: 5.44 K/UL — SIGNIFICANT CHANGE UP (ref 3.8–10.5)

## 2020-11-13 PROCEDURE — 86901 BLOOD TYPING SEROLOGIC RH(D): CPT

## 2020-11-13 PROCEDURE — 85025 COMPLETE CBC W/AUTO DIFF WBC: CPT

## 2020-11-13 PROCEDURE — 36415 COLL VENOUS BLD VENIPUNCTURE: CPT

## 2020-11-13 PROCEDURE — 80048 BASIC METABOLIC PNL TOTAL CA: CPT

## 2020-11-13 PROCEDURE — 85610 PROTHROMBIN TIME: CPT

## 2020-11-13 PROCEDURE — 86850 RBC ANTIBODY SCREEN: CPT

## 2020-11-13 PROCEDURE — 83036 HEMOGLOBIN GLYCOSYLATED A1C: CPT

## 2020-11-13 PROCEDURE — 93005 ELECTROCARDIOGRAM TRACING: CPT

## 2020-11-13 PROCEDURE — 85730 THROMBOPLASTIN TIME PARTIAL: CPT

## 2020-11-13 PROCEDURE — 93010 ELECTROCARDIOGRAM REPORT: CPT

## 2020-11-13 PROCEDURE — 71046 X-RAY EXAM CHEST 2 VIEWS: CPT

## 2020-11-13 PROCEDURE — 86900 BLOOD TYPING SEROLOGIC ABO: CPT

## 2020-11-13 PROCEDURE — G0463: CPT | Mod: 25

## 2020-11-13 PROCEDURE — 71046 X-RAY EXAM CHEST 2 VIEWS: CPT | Mod: 26

## 2020-11-13 RX ORDER — RALOXIFENE HYDROCHLORIDE 60 MG/1
1 TABLET, COATED ORAL
Qty: 0 | Refills: 0 | DISCHARGE

## 2020-11-13 RX ORDER — CHOLECALCIFEROL (VITAMIN D3) 125 MCG
1 CAPSULE ORAL
Qty: 0 | Refills: 0 | DISCHARGE

## 2020-11-13 RX ORDER — ASPIRIN/CALCIUM CARB/MAGNESIUM 324 MG
1 TABLET ORAL
Qty: 0 | Refills: 0 | DISCHARGE

## 2020-11-13 NOTE — H&P PST ADULT - CARDIOVASCULAR
Pt presents today with mom for c/o a laceration to inner upper lip, during wrestling practice just prior to arrival.    details… detailed exam

## 2020-11-13 NOTE — H&P PST ADULT - ASSESSMENT
81 y/o female presents to [facility] for scheduled robotic supracervical hysterectomy with bilateral salpingo oophorectomy, robotic sacrocolpopexy and sling on 20.   CAPRINI SCORE    AGE RELATED RISK FACTORS                                                       MOBILITY RELATED FACTORS  [ ] Age 41-60 years                                            (1 Point)                  [ ] Bed rest                                                        (1 Point)  [ ] Age: 61-74 years                                           (2 Points)                [ ] Plaster cast                                                   (2 Points)  [x ] Age= 75 years                                              (3 Points)                 [ ] Bed bound for more than 72 hours                   (2 Points)    DISEASE RELATED RISK FACTORS                                               GENDER SPECIFIC FACTORS  [ ] Edema in the lower extremities                       (1 Point)                  [ ] Pregnancy                                                     (1 Point)  [ ] Varicose veins                                               (1 Point)                  [ ] Post-partum < 6 weeks                                   (1 Point)             [x ] BMI > 25 Kg/m2                                            (1 Point)                  [ ] Hormonal therapy  or oral contraception            (1 Point)                 [ ] Sepsis (in the previous month)                        (1 Point)                  [ ] History of pregnancy complications  [ ] Pneumonia or serious lung disease                                               [ ] Unexplained or recurrent                       (1 Point)           (in the previous month)                               (1 Point)  [ ] Abnormal pulmonary function test                     (1 Point)                 SURGERY RELATED RISK FACTORS  [ ] Acute myocardial infarction                              (1 Point)                 [ ]  Section                                            (1 Point)  [ ] Congestive heart failure (in the previous month)  (1 Point)                 [ ] Minor surgery                                                 (1 Point)   [ ] Inflammatory bowel disease                             (1 Point)                 [ ] Arthroscopic surgery                                        (2 Points)  [ ] Central venous access                                    (2 Points)                [x ] General surgery lasting more than 45 minutes   (2 Points)       [ ] Stroke (in the previous month)                          (5 Points)               [ ] Elective arthroplasty                                        (5 Points)            [ ] malignancy                                                             (2 points)                                                                                                                                 HEMATOLOGY RELATED FACTORS                                                 TRAUMA RELATED RISK FACTORS  [ ] Prior episodes of VTE                                     (3 Points)                 [ ] Fracture of the hip, pelvis, or leg                       (5 Points)  [ ] Positive family history for VTE                         (3 Points)                 [ ] Acute spinal cord injury (in the previous month)  (5 Points)  [ ] Prothrombin 97571 A                                      (3 Points)                 [ ] Paralysis  (less than 1 month)                          (5 Points)  [ ] Factor V Leiden                                             (3 Points)                 [ ] Multiple Trauma within 1 month                         (5 Points)  [ ] Lupus anticoagulants                                     (3 Points)                                                           [ ] Anticardiolipin antibodies                                (3 Points)                                                       [ ] High homocysteine in the blood                      (3 Points)                                             [ ] Other congenital or acquired thrombophilia       (3 Points)                                                [ ] Heparin induced thrombocytopenia                  (3 Points)                                          Total Score [       6   ]  The Caprini score indicates that this patient is at high risk for a VTE event (score 6 or greater). Surgical patients in this group will benefit from both pharmacologic prophylaxis and intermittent compression devices.  The surgical team will determine the balance between VTE risk and bleeding risk, and other clinical considerations

## 2020-11-13 NOTE — H&P PST ADULT - NSICDXPROBLEM_GEN_ALL_CORE_FT
PROBLEM DIAGNOSES  Problem: Prolapse of vaginal wall  Assessment and Plan: Pre op and Hibiclens instructions given and explained.  Avoid NSAIDs and OTC supplements.   Patient verbalized understanding  medical consult requested surgeon by scheduled 11/17/20  advised to self quarantine, covid19 scheduled for 11/20/20    Problem: Hypothyroidism, acquired  Assessment and Plan: continue synthroid onthe DOS    Problem: Cardiac arrhythmia  Assessment and Plan: Cardiac meds am of surgery with sip of water   Patient verbalized understanding.

## 2020-11-13 NOTE — H&P PST ADULT - HEALTH CARE MAINTENANCE
no recent travels outside of the country or states within the last 14 days, no fever, URI, SOB or recent change /loss in smell or taste   flu vaccine 9/2020

## 2020-11-13 NOTE — H&P PST ADULT - HISTORY OF PRESENT ILLNESS
79 y/o female presents to [facility] for scheduled robotic supracervical hysterectomy with bilateral salpingo oophorectomy, robotic sacrocolpopexy and sling on 11/23/20. Patient reports difficulty urinating and bladder protrusion x1 year seen by gyn procedure advised.

## 2020-11-13 NOTE — H&P PST ADULT - NSICDXPASTMEDICALHX_GEN_ALL_CORE_FT
PAST MEDICAL HISTORY:  Cardiac arrhythmia H/O: bigemeny and trigemeny    Fracture of right lower extremity condyle and tibial plateau fracture    Hearing loss of right ear, unspecified hearing loss type     Hypothyroidism, acquired s/p thyroidectomy    Knee pain, right     Osteoporosis     Pancreatitis 2015    Prolapse of vaginal wall     SVT (supraventricular tachycardia) H/O    Thyroid cancer s/p thyroidectomy

## 2020-11-14 DIAGNOSIS — N39.3 STRESS INCONTINENCE (FEMALE) (MALE): ICD-10-CM

## 2020-11-14 DIAGNOSIS — N99.3 PROLAPSE OF VAGINAL VAULT AFTER HYSTERECTOMY: ICD-10-CM

## 2020-11-16 ENCOUNTER — OUTPATIENT (OUTPATIENT)
Dept: OUTPATIENT SERVICES | Facility: HOSPITAL | Age: 80
LOS: 1 days | End: 2020-11-16
Payer: MEDICARE

## 2020-11-16 DIAGNOSIS — E89.0 POSTPROCEDURAL HYPOTHYROIDISM: Chronic | ICD-10-CM

## 2020-11-16 DIAGNOSIS — E89.2 POSTPROCEDURAL HYPOPARATHYROIDISM: Chronic | ICD-10-CM

## 2020-11-16 DIAGNOSIS — Z98.890 OTHER SPECIFIED POSTPROCEDURAL STATES: Chronic | ICD-10-CM

## 2020-11-16 DIAGNOSIS — Z90.49 ACQUIRED ABSENCE OF OTHER SPECIFIED PARTS OF DIGESTIVE TRACT: Chronic | ICD-10-CM

## 2020-11-16 DIAGNOSIS — Z00.00 ENCOUNTER FOR GENERAL ADULT MEDICAL EXAMINATION WITHOUT ABNORMAL FINDINGS: ICD-10-CM

## 2020-11-16 PROBLEM — N81.10 CYSTOCELE, UNSPECIFIED: Chronic | Status: ACTIVE | Noted: 2020-11-13

## 2020-11-16 LAB
APPEARANCE UR: CLEAR — SIGNIFICANT CHANGE UP
BILIRUB UR-MCNC: NEGATIVE — SIGNIFICANT CHANGE UP
COLOR SPEC: YELLOW — SIGNIFICANT CHANGE UP
DIFF PNL FLD: NEGATIVE — SIGNIFICANT CHANGE UP
GLUCOSE UR QL: NEGATIVE MG/DL — SIGNIFICANT CHANGE UP
KETONES UR-MCNC: NEGATIVE — SIGNIFICANT CHANGE UP
LEUKOCYTE ESTERASE UR-ACNC: NEGATIVE — SIGNIFICANT CHANGE UP
NITRITE UR-MCNC: NEGATIVE — SIGNIFICANT CHANGE UP
PH UR: 6 — SIGNIFICANT CHANGE UP (ref 5–8)
PROT UR-MCNC: NEGATIVE MG/DL — SIGNIFICANT CHANGE UP
SP GR SPEC: 1.01 — SIGNIFICANT CHANGE UP (ref 1.01–1.02)
UROBILINOGEN FLD QL: NEGATIVE MG/DL — SIGNIFICANT CHANGE UP

## 2020-11-16 PROCEDURE — 81003 URINALYSIS AUTO W/O SCOPE: CPT

## 2020-11-16 PROCEDURE — 87086 URINE CULTURE/COLONY COUNT: CPT

## 2020-11-17 DIAGNOSIS — Z00.00 ENCOUNTER FOR GENERAL ADULT MEDICAL EXAMINATION WITHOUT ABNORMAL FINDINGS: ICD-10-CM

## 2020-11-17 LAB
CULTURE RESULTS: SIGNIFICANT CHANGE UP
SPECIMEN SOURCE: SIGNIFICANT CHANGE UP

## 2020-11-20 ENCOUNTER — OUTPATIENT (OUTPATIENT)
Dept: OUTPATIENT SERVICES | Facility: HOSPITAL | Age: 80
LOS: 1 days | End: 2020-11-20
Payer: MEDICARE

## 2020-11-20 DIAGNOSIS — Z90.49 ACQUIRED ABSENCE OF OTHER SPECIFIED PARTS OF DIGESTIVE TRACT: Chronic | ICD-10-CM

## 2020-11-20 DIAGNOSIS — Z98.890 OTHER SPECIFIED POSTPROCEDURAL STATES: Chronic | ICD-10-CM

## 2020-11-20 DIAGNOSIS — N39.3 STRESS INCONTINENCE (FEMALE) (MALE): ICD-10-CM

## 2020-11-20 DIAGNOSIS — N99.3 PROLAPSE OF VAGINAL VAULT AFTER HYSTERECTOMY: ICD-10-CM

## 2020-11-20 DIAGNOSIS — E89.0 POSTPROCEDURAL HYPOTHYROIDISM: Chronic | ICD-10-CM

## 2020-11-20 DIAGNOSIS — E89.2 POSTPROCEDURAL HYPOPARATHYROIDISM: Chronic | ICD-10-CM

## 2020-11-20 DIAGNOSIS — Z11.59 ENCOUNTER FOR SCREENING FOR OTHER VIRAL DISEASES: ICD-10-CM

## 2020-11-20 DIAGNOSIS — Z20.828 CONTACT WITH AND (SUSPECTED) EXPOSURE TO OTHER VIRAL COMMUNICABLE DISEASES: ICD-10-CM

## 2020-11-20 LAB — SARS-COV-2 RNA SPEC QL NAA+PROBE: SIGNIFICANT CHANGE UP

## 2020-11-20 PROCEDURE — U0003: CPT

## 2020-11-20 RX ORDER — SODIUM CHLORIDE 9 MG/ML
3 INJECTION INTRAMUSCULAR; INTRAVENOUS; SUBCUTANEOUS EVERY 8 HOURS
Refills: 0 | Status: DISCONTINUED | OUTPATIENT
Start: 2020-11-23 | End: 2020-11-24

## 2020-11-21 DIAGNOSIS — Z11.59 ENCOUNTER FOR SCREENING FOR OTHER VIRAL DISEASES: ICD-10-CM

## 2020-11-23 ENCOUNTER — APPOINTMENT (OUTPATIENT)
Dept: UROGYNECOLOGY | Facility: HOSPITAL | Age: 80
End: 2020-11-23

## 2020-11-23 ENCOUNTER — RESULT REVIEW (OUTPATIENT)
Age: 80
End: 2020-11-23

## 2020-11-23 ENCOUNTER — OUTPATIENT (OUTPATIENT)
Dept: INPATIENT UNIT | Facility: HOSPITAL | Age: 80
LOS: 1 days | Discharge: HOME | End: 2020-11-23
Payer: MEDICARE

## 2020-11-23 VITALS
SYSTOLIC BLOOD PRESSURE: 138 MMHG | WEIGHT: 145.28 LBS | TEMPERATURE: 98 F | HEART RATE: 68 BPM | RESPIRATION RATE: 16 BRPM | HEIGHT: 62 IN | OXYGEN SATURATION: 99 % | DIASTOLIC BLOOD PRESSURE: 67 MMHG

## 2020-11-23 DIAGNOSIS — Z85.850 PERSONAL HISTORY OF MALIGNANT NEOPLASM OF THYROID: ICD-10-CM

## 2020-11-23 DIAGNOSIS — Z98.890 OTHER SPECIFIED POSTPROCEDURAL STATES: Chronic | ICD-10-CM

## 2020-11-23 DIAGNOSIS — E89.0 POSTPROCEDURAL HYPOTHYROIDISM: Chronic | ICD-10-CM

## 2020-11-23 DIAGNOSIS — E03.9 HYPOTHYROIDISM, UNSPECIFIED: ICD-10-CM
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
## 2020-11-23 DIAGNOSIS — N81.11 CYSTOCELE, MIDLINE: ICD-10-CM

## 2020-11-23 DIAGNOSIS — E89.2 POSTPROCEDURAL HYPOPARATHYROIDISM: Chronic | ICD-10-CM

## 2020-11-23 DIAGNOSIS — S05.00XA INJURY OF CONJUNCTIVA AND CORNEAL ABRASION WITHOUT FOREIGN BODY, UNSPECIFIED EYE, INITIAL ENCOUNTER: ICD-10-CM

## 2020-11-23 DIAGNOSIS — N39.3 STRESS INCONTINENCE (FEMALE) (MALE): ICD-10-CM

## 2020-11-23 DIAGNOSIS — N88.8 OTHER SPECIFIED NONINFLAMMATORY DISORDERS OF CERVIX UTERI: ICD-10-CM

## 2020-11-23 DIAGNOSIS — N99.3 PROLAPSE OF VAGINAL VAULT AFTER HYSTERECTOMY: ICD-10-CM

## 2020-11-23 DIAGNOSIS — N72 INFLAMMATORY DISEASE OF CERVIX UTERI: ICD-10-CM

## 2020-11-23 DIAGNOSIS — Z79.899 OTHER LONG TERM (CURRENT) DRUG THERAPY: ICD-10-CM

## 2020-11-23 DIAGNOSIS — D41.4 NEOPLASM OF UNCERTAIN BEHAVIOR OF BLADDER: ICD-10-CM

## 2020-11-23 DIAGNOSIS — Z90.49 ACQUIRED ABSENCE OF OTHER SPECIFIED PARTS OF DIGESTIVE TRACT: Chronic | ICD-10-CM
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
## 2020-11-23 DIAGNOSIS — R23.4 CHANGES IN SKIN TEXTURE: ICD-10-CM

## 2020-11-23 PROCEDURE — 85027 COMPLETE CBC AUTOMATED: CPT

## 2020-11-23 PROCEDURE — C1771: CPT

## 2020-11-23 PROCEDURE — 88305 TISSUE EXAM BY PATHOLOGIST: CPT | Mod: 26

## 2020-11-23 PROCEDURE — 80048 BASIC METABOLIC PNL TOTAL CA: CPT

## 2020-11-23 PROCEDURE — 57288 REPAIR BLADDER DEFECT: CPT | Mod: 80

## 2020-11-23 PROCEDURE — 88305 TISSUE EXAM BY PATHOLOGIST: CPT

## 2020-11-23 PROCEDURE — 58262 VAG HYST INCLUDING T/O: CPT | Mod: 80

## 2020-11-23 PROCEDURE — 36415 COLL VENOUS BLD VENIPUNCTURE: CPT

## 2020-11-23 PROCEDURE — 57265 CMBN AP COLPRHY W/NTRCL RPR: CPT | Mod: 80

## 2020-11-23 PROCEDURE — 57283 COLPOPEXY INTRAPERITONEAL: CPT | Mod: 80,59

## 2020-11-23 PROCEDURE — 88302 TISSUE EXAM BY PATHOLOGIST: CPT

## 2020-11-23 PROCEDURE — 88302 TISSUE EXAM BY PATHOLOGIST: CPT | Mod: 26

## 2020-11-23 PROCEDURE — 82962 GLUCOSE BLOOD TEST: CPT

## 2020-11-23 RX ORDER — SIMETHICONE 80 MG/1
80 TABLET, CHEWABLE ORAL EVERY 6 HOURS
Refills: 0 | Status: DISCONTINUED | OUTPATIENT
Start: 2020-11-23 | End: 2020-11-24

## 2020-11-23 RX ORDER — SODIUM CHLORIDE 9 MG/ML
1000 INJECTION, SOLUTION INTRAVENOUS
Refills: 0 | Status: DISCONTINUED | OUTPATIENT
Start: 2020-11-23 | End: 2020-11-24

## 2020-11-23 RX ORDER — FENTANYL CITRATE 50 UG/ML
50 INJECTION INTRAVENOUS
Refills: 0 | Status: DISCONTINUED | OUTPATIENT
Start: 2020-11-23 | End: 2020-11-23

## 2020-11-23 RX ORDER — ACETAMINOPHEN 500 MG
1000 TABLET ORAL EVERY 6 HOURS
Refills: 0 | Status: DISCONTINUED | OUTPATIENT
Start: 2020-11-23 | End: 2020-11-24

## 2020-11-23 RX ORDER — ONDANSETRON 8 MG/1
4 TABLET, FILM COATED ORAL EVERY 6 HOURS
Refills: 0 | Status: DISCONTINUED | OUTPATIENT
Start: 2020-11-23 | End: 2020-11-23

## 2020-11-23 RX ORDER — SENNA PLUS 8.6 MG/1
1 TABLET ORAL AT BEDTIME
Refills: 0 | Status: DISCONTINUED | OUTPATIENT
Start: 2020-11-23 | End: 2020-11-24

## 2020-11-23 RX ORDER — SODIUM CHLORIDE 9 MG/ML
1000 INJECTION, SOLUTION INTRAVENOUS
Refills: 0 | Status: DISCONTINUED | OUTPATIENT
Start: 2020-11-23 | End: 2020-11-23

## 2020-11-23 RX ORDER — FAMOTIDINE 10 MG/ML
20 INJECTION INTRAVENOUS ONCE
Refills: 0 | Status: COMPLETED | OUTPATIENT
Start: 2020-11-23 | End: 2020-11-23

## 2020-11-23 RX ORDER — ONDANSETRON 8 MG/1
8 TABLET, FILM COATED ORAL EVERY 8 HOURS
Refills: 0 | Status: DISCONTINUED | OUTPATIENT
Start: 2020-11-23 | End: 2020-11-24

## 2020-11-23 RX ORDER — TOBRAMYCIN 0.3 %
1 DROPS OPHTHALMIC (EYE) EVERY 4 HOURS
Refills: 0 | Status: COMPLETED | OUTPATIENT
Start: 2020-11-23 | End: 2020-11-24

## 2020-11-23 RX ORDER — OXYCODONE HYDROCHLORIDE 5 MG/1
5 TABLET ORAL ONCE
Refills: 0 | Status: DISCONTINUED | OUTPATIENT
Start: 2020-11-23 | End: 2020-11-23

## 2020-11-23 RX ORDER — ACETAMINOPHEN 500 MG
975 TABLET ORAL ONCE
Refills: 0 | Status: COMPLETED | OUTPATIENT
Start: 2020-11-23 | End: 2020-11-23

## 2020-11-23 RX ORDER — VERAPAMIL HCL 240 MG
120 CAPSULE, EXTENDED RELEASE PELLETS 24 HR ORAL DAILY
Refills: 0 | Status: DISCONTINUED | OUTPATIENT
Start: 2020-11-24 | End: 2020-11-24

## 2020-11-23 RX ORDER — IBUPROFEN 200 MG
600 TABLET ORAL EVERY 6 HOURS
Refills: 0 | Status: DISCONTINUED | OUTPATIENT
Start: 2020-11-23 | End: 2020-11-24

## 2020-11-23 RX ORDER — OXYCODONE HYDROCHLORIDE 5 MG/1
5 TABLET ORAL
Refills: 0 | Status: DISCONTINUED | OUTPATIENT
Start: 2020-11-23 | End: 2020-11-24

## 2020-11-23 RX ORDER — LEVOTHYROXINE SODIUM 125 MCG
100 TABLET ORAL DAILY
Refills: 0 | Status: DISCONTINUED | OUTPATIENT
Start: 2020-11-24 | End: 2020-11-24

## 2020-11-23 RX ORDER — OXYCODONE HYDROCHLORIDE 5 MG/1
10 TABLET ORAL EVERY 4 HOURS
Refills: 0 | Status: DISCONTINUED | OUTPATIENT
Start: 2020-11-23 | End: 2020-11-24

## 2020-11-23 RX ADMIN — Medication 1 DROP(S): at 21:08

## 2020-11-23 RX ADMIN — Medication 975 MILLIGRAM(S): at 10:58

## 2020-11-23 RX ADMIN — Medication 600 MILLIGRAM(S): at 18:43

## 2020-11-23 RX ADMIN — Medication 1000 MILLIGRAM(S): at 23:04

## 2020-11-23 RX ADMIN — Medication 600 MILLIGRAM(S): at 18:20

## 2020-11-23 RX ADMIN — Medication 975 MILLIGRAM(S): at 11:00

## 2020-11-23 RX ADMIN — FAMOTIDINE 20 MILLIGRAM(S): 10 INJECTION INTRAVENOUS at 10:58

## 2020-11-23 RX ADMIN — SODIUM CHLORIDE 3 MILLILITER(S): 9 INJECTION INTRAMUSCULAR; INTRAVENOUS; SUBCUTANEOUS at 21:09

## 2020-11-23 RX ADMIN — Medication 1000 MILLIGRAM(S): at 23:03

## 2020-11-23 RX ADMIN — Medication 1000 MILLIGRAM(S): at 17:36

## 2020-11-23 RX ADMIN — Medication 1 DROP(S): at 17:36

## 2020-11-23 RX ADMIN — Medication 1000 MILLIGRAM(S): at 18:30

## 2020-11-23 NOTE — BRIEF OPERATIVE NOTE - NSICDXBRIEFPROCEDURE_GEN_ALL_CORE_FT
PROCEDURES:  Suspension, ligament, uterosacral 23-Nov-2020 14:06:33  Meet Heath  Posterior vaginal repair 23-Nov-2020 14:06:23  Meet Heath  Repair, vagina, anterior wall 23-Nov-2020 14:06:13  Meet Heath  Creation of urethral sling using tension-free vaginal tape (TVT) with cystoscopy 23-Nov-2020 14:05:54  Meet Heath  Total vaginal hysterectomy with cystoscopy 23-Nov-2020 14:05:40  Meet Heath

## 2020-11-23 NOTE — BRIEF OPERATIVE NOTE - NSICDXBRIEFPOSTOP_GEN_ALL_CORE_FT
POST-OP DIAGNOSIS:  Incomplete prolapse of vaginal vault 23-Nov-2020 14:07:41  Meet Heath  Urinary, incontinence, stress female 23-Nov-2020 14:07:32  Meet Heath

## 2020-11-23 NOTE — PROGRESS NOTE ADULT - SUBJECTIVE AND OBJECTIVE BOX
POD#0 s/p Vaginal hysterectomy, uterosacral ligament suspension, AP repair, urethral sling, cystoscopy    S:    Patient was seen and examined at bedside in 1N.   The patient is doing well.   Reports pain from corneal abrasion. Recently received eyedrops  Pain is controlled on current regiment.   Tolerating regular diet, denies N/V.   Atkinson in place.   Patient has not tried ambulating.   Reports no flatus, no BM.    O:   T(C): 36.3 (11-23-20 @ 15:00), Max: 36.8 (11-23-20 @ 13:41)  HR: 62 (11-23-20 @ 15:51) (57 - 68)  BP: 120/58 (11-23-20 @ 15:51) (98/54 - 138/67)  RR: 16 (11-23-20 @ 15:51) (12 - 16)  SpO2: 100% (11-23-20 @ 15:51) (95% - 100%)    Gen: NAD  CV: RRR  Lung: CTABL  Abdomen: soft, nontender, + BS   Incision: suprapubic - clean dry and intact  Pelvic: deferred          11-23-20 @ 07:01  -  11-23-20 @ 17:50  --------------------------------------------------------  IN: 1200 mL / OUT: 300 mL / NET: 900 mL          acetaminophen   Tablet .. 1000 milliGRAM(s) Oral every 6 hours  acetaminophen   Tablet .. 1000 milliGRAM(s) Oral every 6 hours PRN  ibuprofen  Tablet. 600 milliGRAM(s) Oral every 6 hours  lactated ringers. 1000 milliLiter(s) IV Continuous <Continuous>  ondansetron    Tablet 8 milliGRAM(s) Oral every 8 hours PRN  oxyCODONE    IR 5 milliGRAM(s) Oral every 3 hours PRN  oxyCODONE    IR 10 milliGRAM(s) Oral every 4 hours PRN  senna 1 Tablet(s) Oral at bedtime PRN  simethicone 80 milliGRAM(s) Chew every 6 hours PRN  sodium chloride 0.9% lock flush 3 milliLiter(s) IV Push every 8 hours  tobramycin 0.3% Ophthalmic Solution 1 Drop(s) Right EYE every 4 hours

## 2020-11-23 NOTE — PROGRESS NOTE ADULT - ASSESSMENT
A/P   POD#0 s/p Vaginal hysterectomy, uterosacral ligament suspension, AP repair, urethral sling, cystoscopy  -Stable. Am CBC ordered  -Continue post operative care  -: Atkinson in place. TOV in AM  -GI: BM and flatus not present  -DVT ppx: SCDs  -D/C tomorrow pending TOV

## 2020-11-23 NOTE — BRIEF OPERATIVE NOTE - OPERATION/FINDINGS
cystoscopy: bilateral ureteral jets observed after uterosacral ligament suspension and sling, bladder survey revealed no masses, anatomical abnormalities, active bleeding, or perforation   left ureteral orifice noted to have tubular growths protruding from opening, biopsy taken and sent

## 2020-11-23 NOTE — ASU DISCHARGE PLAN (ADULT/PEDIATRIC) - CALL YOUR DOCTOR IF YOU HAVE ANY OF THE FOLLOWING:
Wound/Surgical Site with redness, or foul smelling discharge or pus/Increased irritability or sluggishness/Unable to urinate/Swelling that gets worse/Fever greater than (need to indicate Fahrenheit or Celsius)/Excessive diarrhea/Inability to tolerate liquids or foods/Bleeding that does not stop/Nausea and vomiting that does not stop/Numbness, tingling, color or temperature change to extremity/Pain not relieved by Medications

## 2020-11-23 NOTE — ASU DISCHARGE PLAN (ADULT/PEDIATRIC) - ACTIVITY LEVEL
No douching/No intercourse/Exercise/No heavy lifting/Nothing per rectum/Nothing per vagina/No tub baths/No tampons/Weight bearing as tolerated

## 2020-11-23 NOTE — BRIEF OPERATIVE NOTE - NSICDXBRIEFPREOP_GEN_ALL_CORE_FT
PRE-OP DIAGNOSIS:  Incomplete prolapse of vaginal vault 23-Nov-2020 14:07:20  Meet Heath  Urinary, incontinence, stress female 23-Nov-2020 14:07:08  Meet Heath

## 2020-11-24 VITALS
OXYGEN SATURATION: 97 % | RESPIRATION RATE: 16 BRPM | TEMPERATURE: 98 F | SYSTOLIC BLOOD PRESSURE: 132 MMHG | DIASTOLIC BLOOD PRESSURE: 63 MMHG | HEART RATE: 72 BPM

## 2020-11-24 LAB
ANION GAP SERPL CALC-SCNC: 6 MMOL/L — SIGNIFICANT CHANGE UP (ref 5–17)
BUN SERPL-MCNC: 18 MG/DL — SIGNIFICANT CHANGE UP (ref 7–23)
CALCIUM SERPL-MCNC: 8.3 MG/DL — LOW (ref 8.5–10.1)
CHLORIDE SERPL-SCNC: 110 MMOL/L — HIGH (ref 96–108)
CO2 SERPL-SCNC: 24 MMOL/L — SIGNIFICANT CHANGE UP (ref 22–31)
CREAT SERPL-MCNC: 1.06 MG/DL — SIGNIFICANT CHANGE UP (ref 0.5–1.3)
GLUCOSE SERPL-MCNC: 124 MG/DL — HIGH (ref 70–99)
HCT VFR BLD CALC: 34 % — LOW (ref 34.5–45)
HGB BLD-MCNC: 11.3 G/DL — LOW (ref 11.5–15.5)
MCHC RBC-ENTMCNC: 30 PG — SIGNIFICANT CHANGE UP (ref 27–34)
MCHC RBC-ENTMCNC: 33.2 GM/DL — SIGNIFICANT CHANGE UP (ref 32–36)
MCV RBC AUTO: 90.2 FL — SIGNIFICANT CHANGE UP (ref 80–100)
PLATELET # BLD AUTO: 168 K/UL — SIGNIFICANT CHANGE UP (ref 150–400)
POTASSIUM SERPL-MCNC: 5 MMOL/L — SIGNIFICANT CHANGE UP (ref 3.5–5.3)
POTASSIUM SERPL-SCNC: 5 MMOL/L — SIGNIFICANT CHANGE UP (ref 3.5–5.3)
RBC # BLD: 3.77 M/UL — LOW (ref 3.8–5.2)
RBC # FLD: 12.3 % — SIGNIFICANT CHANGE UP (ref 10.3–14.5)
SODIUM SERPL-SCNC: 140 MMOL/L — SIGNIFICANT CHANGE UP (ref 135–145)
WBC # BLD: 11.99 K/UL — HIGH (ref 3.8–10.5)
WBC # FLD AUTO: 11.99 K/UL — HIGH (ref 3.8–10.5)

## 2020-11-24 RX ADMIN — Medication 600 MILLIGRAM(S): at 11:37

## 2020-11-24 RX ADMIN — Medication 1 DROP(S): at 01:02

## 2020-11-24 RX ADMIN — Medication 600 MILLIGRAM(S): at 00:58

## 2020-11-24 RX ADMIN — SODIUM CHLORIDE 3 MILLILITER(S): 9 INJECTION INTRAMUSCULAR; INTRAVENOUS; SUBCUTANEOUS at 10:20

## 2020-11-24 RX ADMIN — SODIUM CHLORIDE 3 MILLILITER(S): 9 INJECTION INTRAMUSCULAR; INTRAVENOUS; SUBCUTANEOUS at 05:17

## 2020-11-24 RX ADMIN — Medication 600 MILLIGRAM(S): at 06:16

## 2020-11-24 RX ADMIN — Medication 600 MILLIGRAM(S): at 06:15

## 2020-11-24 RX ADMIN — Medication 1000 MILLIGRAM(S): at 11:37

## 2020-11-24 RX ADMIN — Medication 1000 MILLIGRAM(S): at 05:17

## 2020-11-24 RX ADMIN — Medication 100 MICROGRAM(S): at 05:17

## 2020-11-24 RX ADMIN — Medication 120 MILLIGRAM(S): at 08:56

## 2020-11-24 NOTE — PROGRESS NOTE ADULT - SUBJECTIVE AND OBJECTIVE BOX
HD#2  POD#1 s/p Vaginal hysterectomy, uterosacral ligament suspension, AP repair, urethral sling, cystoscopy    S:  Patient was seen and examined at bedside in 2N. The patient is doing well. Pain is controlled, did not require opiates overnight. Tolerating regular diet, denies N/V. Lopez + packing have been removed. Bladder was not filled prior to pulling the lopez. Patient has not tried ambulating. Reports flatus, no BM.    O:   T(C): 36.9 (11-24-20 @ 05:35), Max: 36.9 (11-24-20 @ 05:35)  HR: 59 (11-24-20 @ 05:35) (57 - 68)  BP: 103/56 (11-24-20 @ 05:35) (98/54 - 138/67)  RR: 16 (11-24-20 @ 05:35) (12 - 16)  SpO2: 98% (11-24-20 @ 05:35) (95% - 100%)    Gen: NAD  CV: RRR  Lung: CTABL  Abdomen: soft, nontender, + BS   Incision: suprapubic - c/d/i covered by dermabond  Pelvic: vaginal packing removed    11-23-20 @ 07:01  -  11-24-20 @ 06:37  --------------------------------------------------------  IN: 2650 mL / OUT: 1750 mL / NET: 900 mL    acetaminophen   Tablet .. 1000 milliGRAM(s) Oral every 6 hours  acetaminophen   Tablet .. 1000 milliGRAM(s) Oral every 6 hours PRN  ibuprofen  Tablet. 600 milliGRAM(s) Oral every 6 hours  lactated ringers. 1000 milliLiter(s) IV Continuous <Continuous>  levothyroxine 100 MICROGram(s) Oral daily  ondansetron    Tablet 8 milliGRAM(s) Oral every 8 hours PRN  oxyCODONE    IR 5 milliGRAM(s) Oral every 3 hours PRN  oxyCODONE    IR 10 milliGRAM(s) Oral every 4 hours PRN  senna 1 Tablet(s) Oral at bedtime PRN  simethicone 80 milliGRAM(s) Chew every 6 hours PRN  sodium chloride 0.9% lock flush 3 milliLiter(s) IV Push every 8 hours  verapamil 120 milliGRAM(s) Oral daily

## 2020-11-24 NOTE — PROGRESS NOTE ADULT - ASSESSMENT
A/P   HD#2  POD#1 s/p Vaginal hysterectomy, uterosacral ligament suspension, AP repair, urethral sling, cystoscopy  -Stable. CBC pending this AM  -Pain: well controlled on tylenol/ibuprofen. Did not require opiates overnight  -Continue post operative care  -: currently on TOV  -GI: Flatus present  -DVT ppx: SCDs  -D/C pending TOV

## 2020-11-30 ENCOUNTER — APPOINTMENT (OUTPATIENT)
Dept: UROGYNECOLOGY | Facility: CLINIC | Age: 80
End: 2020-11-30
Payer: MEDICARE

## 2020-11-30 PROCEDURE — 99024 POSTOP FOLLOW-UP VISIT: CPT

## 2020-11-30 NOTE — SUBJECTIVE
[FreeTextEntry1] : complaining of discomfort with catheter that began a day ago  [FreeTextEntry8] : none [FreeTextEntry7] : discomfort from catheter  [FreeTextEntry6] : ok [FreeTextEntry5] : lopez instilled 300 ml voided 286 ml .  [FreeTextEntry4] : taking softeners  [FreeTextEntry3] : ok [FreeTextEntry2] : ok except for last night due to catheter

## 2020-11-30 NOTE — DISCUSSION/SUMMARY
[Post-Op instructions given. Pt/family verbalizes understanding] : post-operative instructions were provided to the patient/family who verbalize understanding [FreeTextEntry1] : pelvic rest no straining

## 2020-11-30 NOTE — ASSESSMENT
[FreeTextEntry1] : 81 y/o female 7 days post vaginal  reconstruction , sling and cystoscopy pt discharged on 11/24 with lopez catheter . TOV completed today pt passed lopez dc pt to keep scheduled appointment . call if need arises sooner. pt understands and agree.

## 2020-11-30 NOTE — OBJECTIVE
[Voiding Trial] : Voiding trial was performed [Post Void Residual ____ ml] : Post Void Residual was [unfilled] ml

## 2020-12-01 ENCOUNTER — NON-APPOINTMENT (OUTPATIENT)
Age: 80
End: 2020-12-01

## 2020-12-07 ENCOUNTER — APPOINTMENT (OUTPATIENT)
Dept: UROGYNECOLOGY | Facility: CLINIC | Age: 80
End: 2020-12-07
Payer: MEDICARE

## 2020-12-07 VITALS
DIASTOLIC BLOOD PRESSURE: 79 MMHG | HEART RATE: 60 BPM | SYSTOLIC BLOOD PRESSURE: 127 MMHG | RESPIRATION RATE: 20 BRPM | OXYGEN SATURATION: 97 %

## 2020-12-07 LAB
BILIRUB UR QL STRIP: NEGATIVE
CLARITY UR: CLEAR
COLLECTION METHOD: NORMAL
GLUCOSE UR-MCNC: NEGATIVE
HCG UR QL: 0.2 EU/DL
HGB UR QL STRIP.AUTO: NORMAL
KETONES UR-MCNC: NEGATIVE
LEUKOCYTE ESTERASE UR QL STRIP: NORMAL
NITRITE UR QL STRIP: NEGATIVE
PH UR STRIP: 5.5
PROT UR STRIP-MCNC: 30
SP GR UR STRIP: 1.02

## 2020-12-07 PROCEDURE — 99024 POSTOP FOLLOW-UP VISIT: CPT

## 2020-12-07 PROCEDURE — 81003 URINALYSIS AUTO W/O SCOPE: CPT | Mod: QW

## 2020-12-07 NOTE — ASSESSMENT
[FreeTextEntry1] : 79 y/o female post vaginal hysterectomy , usls, sling and cystoscopy pt is doing well with minimal discomfort to lower backside, otherwise doing well. pt advised to keep scheduled appointment for exam with Dr. Davila

## 2020-12-07 NOTE — SUBJECTIVE
[FreeTextEntry1] : getting better  [FreeTextEntry8] : no changes  [FreeTextEntry7] : denied just pressure  [FreeTextEntry6] : ok [FreeTextEntry5] : no leaking of urine with laugh cough or sneeze, feels like she empties well, good urine stream , no dysuria , no frequency  [FreeTextEntry4] : normal  [FreeTextEntry3] : ok [FreeTextEntry2] : ok

## 2021-01-04 ENCOUNTER — APPOINTMENT (OUTPATIENT)
Dept: UROGYNECOLOGY | Facility: CLINIC | Age: 81
End: 2021-01-04
Payer: MEDICARE

## 2021-01-04 ENCOUNTER — RESULT CHARGE (OUTPATIENT)
Age: 81
End: 2021-01-04

## 2021-01-04 VITALS — DIASTOLIC BLOOD PRESSURE: 91 MMHG | SYSTOLIC BLOOD PRESSURE: 138 MMHG

## 2021-01-04 DIAGNOSIS — Z98.890 OTHER SPECIFIED POSTPROCEDURAL STATES: ICD-10-CM

## 2021-01-04 DIAGNOSIS — N81.9 FEMALE GENITAL PROLAPSE, UNSPECIFIED: ICD-10-CM

## 2021-01-04 LAB
BILIRUB UR QL STRIP: NEGATIVE
CLARITY UR: CLEAR
COLLECTION METHOD: NORMAL
GLUCOSE UR-MCNC: NEGATIVE
HCG UR QL: 0.2 EU/DL
HGB UR QL STRIP.AUTO: NEGATIVE
KETONES UR-MCNC: NEGATIVE
LEUKOCYTE ESTERASE UR QL STRIP: NORMAL
NITRITE UR QL STRIP: POSITIVE
PH UR STRIP: 5.5
PROT UR STRIP-MCNC: NEGATIVE
SP GR UR STRIP: 1.02

## 2021-01-04 PROCEDURE — 99024 POSTOP FOLLOW-UP VISIT: CPT

## 2021-01-04 NOTE — HISTORY OF PRESENT ILLNESS
[FreeTextEntry1] : Pt reports doing very well post-operatively and she is very happy with surgery.  She denies any pain.  No problems with ambulation, sleeping or appetite.  She denies dysuria, incomplete emptying or CHIVO.  No problems with BM.  Does note nocturia x 3 but this is unchanged from prior to surgery.

## 2021-01-04 NOTE — PHYSICAL EXAM
[Aa ____] : Aa [unfilled] [Ba ____] : Ba [unfilled] [C ____] : C [unfilled] [GH ____] : GH [unfilled] [PB ____] : PB [unfilled] [TVL ____] : TVL  [unfilled] [Ap ____] : Ap [unfilled] [Bp ____] : Bp [unfilled] [D ____] : D [unfilled] [No Acute Distress] : in no acute distress [Well developed] : well developed [Well Nourished] : ~L well nourished [Good Hygeine] : demonstrates good hygeine [Oriented x3] : oriented to person, place, and time [Normal Memory] : ~T memory was ~L unimpaired [Normal Mood/Affect] : mood and affect are normal [Warm and Dry] : was warm and dry to touch [Normal Gait] : gait was normal

## 2021-01-04 NOTE — DISCUSSION/SUMMARY
[FreeTextEntry1] : Activity clearance discussed.  Reviewed pathology including bladder biopsy.  Pt will f/u in 6 months for cysto.  She denies any UTI symptoms today.  Instructed to call with any questions or concerns and she verbalizes understanding.

## 2021-01-04 NOTE — LETTER BODY
[Dear  ___] : Dear  [unfilled], [Attached please find my note.] : Attached please find my note. [Thank you very much for allowing me to participate in the care of this patient. If you have any questions, please do not hesitate to contact me] : Thank you very much for allowing me to participate in the care of this patient. If you have any questions, please do not hesitate to contact me. [DrDelia  ___] : Dr. HEBERT

## 2021-03-30 ENCOUNTER — EMERGENCY (EMERGENCY)
Facility: HOSPITAL | Age: 81
LOS: 0 days | Discharge: HOME | End: 2021-03-30
Attending: EMERGENCY MEDICINE
Payer: MEDICARE

## 2021-03-30 VITALS
DIASTOLIC BLOOD PRESSURE: 66 MMHG | TEMPERATURE: 98 F | SYSTOLIC BLOOD PRESSURE: 120 MMHG | RESPIRATION RATE: 18 BRPM | OXYGEN SATURATION: 95 % | HEART RATE: 73 BPM

## 2021-03-30 VITALS — WEIGHT: 145.06 LBS | HEIGHT: 62 IN

## 2021-03-30 DIAGNOSIS — E03.9 HYPOTHYROIDISM, UNSPECIFIED: ICD-10-CM

## 2021-03-30 DIAGNOSIS — R53.83 OTHER FATIGUE: ICD-10-CM

## 2021-03-30 DIAGNOSIS — U07.1 COVID-19: ICD-10-CM

## 2021-03-30 DIAGNOSIS — I49.9 CARDIAC ARRHYTHMIA, UNSPECIFIED: ICD-10-CM

## 2021-03-30 DIAGNOSIS — N81.10 CYSTOCELE, UNSPECIFIED: ICD-10-CM

## 2021-03-30 DIAGNOSIS — I47.1 SUPRAVENTRICULAR TACHYCARDIA: ICD-10-CM

## 2021-03-30 DIAGNOSIS — M81.0 AGE-RELATED OSTEOPOROSIS WITHOUT CURRENT PATHOLOGICAL FRACTURE: ICD-10-CM

## 2021-03-30 DIAGNOSIS — Z90.49 ACQUIRED ABSENCE OF OTHER SPECIFIED PARTS OF DIGESTIVE TRACT: Chronic | ICD-10-CM

## 2021-03-30 DIAGNOSIS — Z87.19 PERSONAL HISTORY OF OTHER DISEASES OF THE DIGESTIVE SYSTEM: ICD-10-CM

## 2021-03-30 DIAGNOSIS — Z98.890 OTHER SPECIFIED POSTPROCEDURAL STATES: Chronic | ICD-10-CM

## 2021-03-30 DIAGNOSIS — E89.2 POSTPROCEDURAL HYPOPARATHYROIDISM: Chronic | ICD-10-CM

## 2021-03-30 DIAGNOSIS — Z85.850 PERSONAL HISTORY OF MALIGNANT NEOPLASM OF THYROID: ICD-10-CM

## 2021-03-30 DIAGNOSIS — E89.0 POSTPROCEDURAL HYPOTHYROIDISM: Chronic | ICD-10-CM

## 2021-03-30 DIAGNOSIS — H91.91 UNSPECIFIED HEARING LOSS, RIGHT EAR: ICD-10-CM

## 2021-03-30 LAB
ALBUMIN SERPL ELPH-MCNC: 3.7 G/DL — SIGNIFICANT CHANGE UP (ref 3.3–5)
ALP SERPL-CCNC: 41 U/L — SIGNIFICANT CHANGE UP (ref 40–120)
ALT FLD-CCNC: 25 U/L — SIGNIFICANT CHANGE UP (ref 12–78)
ANION GAP SERPL CALC-SCNC: 8 MMOL/L — SIGNIFICANT CHANGE UP (ref 5–17)
AST SERPL-CCNC: 34 U/L — SIGNIFICANT CHANGE UP (ref 15–37)
BASOPHILS # BLD AUTO: 0.04 K/UL — SIGNIFICANT CHANGE UP (ref 0–0.2)
BASOPHILS NFR BLD AUTO: 0.8 % — SIGNIFICANT CHANGE UP (ref 0–2)
BILIRUB SERPL-MCNC: 0.3 MG/DL — SIGNIFICANT CHANGE UP (ref 0.2–1.2)
BUN SERPL-MCNC: 28 MG/DL — HIGH (ref 7–23)
CALCIUM SERPL-MCNC: 8.7 MG/DL — SIGNIFICANT CHANGE UP (ref 8.5–10.1)
CHLORIDE SERPL-SCNC: 109 MMOL/L — HIGH (ref 96–108)
CO2 SERPL-SCNC: 21 MMOL/L — LOW (ref 22–31)
CREAT SERPL-MCNC: 1.3 MG/DL — SIGNIFICANT CHANGE UP (ref 0.5–1.3)
EOSINOPHIL # BLD AUTO: 0.03 K/UL — SIGNIFICANT CHANGE UP (ref 0–0.5)
EOSINOPHIL NFR BLD AUTO: 0.6 % — SIGNIFICANT CHANGE UP (ref 0–6)
GLUCOSE SERPL-MCNC: 80 MG/DL — SIGNIFICANT CHANGE UP (ref 70–99)
HCT VFR BLD CALC: 44.4 % — SIGNIFICANT CHANGE UP (ref 34.5–45)
HGB BLD-MCNC: 14.8 G/DL — SIGNIFICANT CHANGE UP (ref 11.5–15.5)
IMM GRANULOCYTES NFR BLD AUTO: 0.2 % — SIGNIFICANT CHANGE UP (ref 0–1.5)
LYMPHOCYTES # BLD AUTO: 0.88 K/UL — LOW (ref 1–3.3)
LYMPHOCYTES # BLD AUTO: 17.4 % — SIGNIFICANT CHANGE UP (ref 13–44)
MCHC RBC-ENTMCNC: 30 PG — SIGNIFICANT CHANGE UP (ref 27–34)
MCHC RBC-ENTMCNC: 33.3 GM/DL — SIGNIFICANT CHANGE UP (ref 32–36)
MCV RBC AUTO: 90.1 FL — SIGNIFICANT CHANGE UP (ref 80–100)
MONOCYTES # BLD AUTO: 1.05 K/UL — HIGH (ref 0–0.9)
MONOCYTES NFR BLD AUTO: 20.8 % — HIGH (ref 2–14)
NEUTROPHILS # BLD AUTO: 3.04 K/UL — SIGNIFICANT CHANGE UP (ref 1.8–7.4)
NEUTROPHILS NFR BLD AUTO: 60.2 % — SIGNIFICANT CHANGE UP (ref 43–77)
PLATELET # BLD AUTO: 149 K/UL — LOW (ref 150–400)
POTASSIUM SERPL-MCNC: 4.5 MMOL/L — SIGNIFICANT CHANGE UP (ref 3.5–5.3)
POTASSIUM SERPL-SCNC: 4.5 MMOL/L — SIGNIFICANT CHANGE UP (ref 3.5–5.3)
PROT SERPL-MCNC: 7.2 GM/DL — SIGNIFICANT CHANGE UP (ref 6–8.3)
RBC # BLD: 4.93 M/UL — SIGNIFICANT CHANGE UP (ref 3.8–5.2)
RBC # FLD: 12.4 % — SIGNIFICANT CHANGE UP (ref 10.3–14.5)
SODIUM SERPL-SCNC: 138 MMOL/L — SIGNIFICANT CHANGE UP (ref 135–145)
WBC # BLD: 5.05 K/UL — SIGNIFICANT CHANGE UP (ref 3.8–10.5)
WBC # FLD AUTO: 5.05 K/UL — SIGNIFICANT CHANGE UP (ref 3.8–10.5)

## 2021-03-30 PROCEDURE — 71045 X-RAY EXAM CHEST 1 VIEW: CPT

## 2021-03-30 PROCEDURE — 85025 COMPLETE CBC W/AUTO DIFF WBC: CPT

## 2021-03-30 PROCEDURE — 71045 X-RAY EXAM CHEST 1 VIEW: CPT | Mod: 26

## 2021-03-30 PROCEDURE — 99283 EMERGENCY DEPT VISIT LOW MDM: CPT | Mod: 25

## 2021-03-30 PROCEDURE — U0003: CPT

## 2021-03-30 PROCEDURE — U0005: CPT

## 2021-03-30 PROCEDURE — 36415 COLL VENOUS BLD VENIPUNCTURE: CPT

## 2021-03-30 PROCEDURE — 80053 COMPREHEN METABOLIC PANEL: CPT

## 2021-03-30 PROCEDURE — 99284 EMERGENCY DEPT VISIT MOD MDM: CPT | Mod: CS

## 2021-03-30 RX ORDER — SODIUM CHLORIDE 9 MG/ML
250 INJECTION INTRAMUSCULAR; INTRAVENOUS; SUBCUTANEOUS
Refills: 0 | Status: DISCONTINUED | OUTPATIENT
Start: 2021-03-30 | End: 2021-03-30

## 2021-03-30 NOTE — ED STATDOCS - CROS ED CONS ALL NEG
[facility] INPATIENT ENCOUNTER  CONSULT NOTE    ADMISSION DATE:  11/12/2019  CONSULTING PHYSICIAN:  Vishnu Ruth MD  ATTENDING PHYSICIAN:  Franki South MD   CODE STATUS:  No Order      CHIEF COMPLAINT:    Chief Complaint   Patient presents with   • Abdominal Pain   • Vomiting         SUBJECTIVE:    I was asked to see Lyubov Rodríguez at the request of Frances Baeza MD   for Gastroenterology Consultation.   Lyubov Rodríguez is a 50 year old female patient admitted with Intractable vomiting [R11.10]  Intractable vomiting with nausea, unspecified vomiting type [R11.2].  Gastroenterology Consultation was requested for right upper quadrant pain, nausea vomiting, diarrhea.    The patient reports she has been in and out of the hospital over the last month with right upper quadrant pain, nausea vomiting and diarrhea.  Patient reports that she came back to the hospital because of the right upper quadrant pain and her symptomatology.  He does not want to leave here until diagnosis is made.  Her total bilirubin is 1.2.  All of her other LFTs are normal.  Patient not reporting NSAID use.  She is not having any blood in her stools.  No fevers.  Patient had an ultrasound scan of her right upper quadrant showed sludge in the gallbladder.      REVIEW OF SYSTEMS:    Eye Problem(s):negative  ENT Problem(s):negative  Cardiovascular problem(s):negative  Respiratory problem(s):negative  Gastro-intestinal problem(s):abdominal pain  Genito-urinary problem(s):negative  Musculoskeletal problem(s):negative  Integumentary problem(s):negative  Neurological problem(s):negative  Psychiatric problem(s):negative  Endocrine problem(s):negative  Hematologic and/or Lymphatic problem(s):negative    OBJECTIVE:    PROBLEM LIST:    Patient Active Problem List   Diagnosis   • Intractable vomiting   • Right sided abdominal pain       MEDICATIONS:    Current Facility-Administered Medications   Medication Dose Route Frequency Provider Last Rate Last  Dose   • acetaminophen (TYLENOL) tablet 650 mg  650 mg Oral Q4H PRN Franki South MD   650 mg at 11/13/19 0651   • aluminum-magnesium hydroxide-simethicone (MAALOX) 200-200-20 MG/5ML suspension 30 mL  30 mL Oral PRN Pam Zavala PA-C   30 mL at 11/12/19 2138    And   • lidocaine viscous 2 % oral solution 15 mL  15 mL Oral PRN Pam Zavala PA-C   15 mL at 11/12/19 2138   • sodium chloride 0.9% infusion   Intravenous Continuous Franki South  mL/hr at 11/13/19 0949     • ondansetron (ZOFRAN) injection 4 mg  4 mg Intravenous 4x Daily PRN Franki South MD   4 mg at 11/13/19 0427   • estradiol (CLIMARA) 0.05 MG/24HR once weekly patch 1 patch  1 patch Transdermal Once per day on Wed Franki South MD   1 patch at 11/13/19 0944   • pantoprazole (PROTONIX) EC tablet 40 mg  40 mg Oral QAM AC Franki South MD   40 mg at 11/13/19 0651        VITAL SIGNS:    Vital Last Value 24 Hour Range   Temperature 98.6 °F (37 °C) (11/13/19 0730) Temp  Min: 97.9 °F (36.6 °C)  Max: 98.6 °F (37 °C)   Pulse 80 (11/13/19 0730) Pulse  Min: 65  Max: 88   Respiratory 12 (11/13/19 0730) Resp  Min: 12  Max: 16   Non-Invasive  Blood Pressure 116/72 (11/13/19 0730) BP  Min: 103/64  Max: 131/96   Pulse Oximetry 95 % (11/13/19 0730) SpO2  Min: 95 %  Max: 99 %     Vital Today Admitted   Weight 80 kg (176 lb 5.9 oz) (11/12/19 2140) Weight: 79.8 kg (175 lb 14.8 oz) (11/12/19 1908)   Height N/A Height: 5' 6\" (167.6 cm) (11/12/19 2140)   BMI N/A BMI (Calculated): 28.47 (11/12/19 2140)       PHYSICAL EXAM:    General: The patient is well developed, well nourished, in no acute distress, appears stated age.   Neurologic: Alert. Normal mood and affect, normal speech, no gross tremor.  Skin: Warm and dry, normal turgor.  Head: Normocephalic.  Eyes: Normal conjunctivae and sclerae.  Pupils equal, round, reactive to light.  Extraocular movements intact.  HEENT: Oral pharynx clear, moist mucous membranes, external nose is normal to  inspection.  Neck: Symmetric without swelling or tenderness.   Respiratory: Respiratory effort normal.   Cardiovascular: Regular rate and rhythm.  Extremities: No swelling or tenderness.  Gastrointestinal:  Soft and nontender.  Normal bowel sounds.  No hepatomegaly or splenomegaly.     LABORATORY DATA:    Recent Results (from the past 24 hour(s))   URINALYSIS & REFLEX MICRO WITH CULTURE IF INDICATED    Collection Time: 11/12/19  1:20 PM   Result Value Ref Range    SPECIMEN TYPE URINE CLEAN CATCH     COLOR YELLOW YELLOW    APPEARANCE CLEAR     GLUCOSE(URINE) NEGATIVE NEGATIVE mg/dL    BILIRUBIN NEGATIVE NEGATIVE    KETONES 20 (A) NEGATIVE mg/dL    SPECIFIC GRAVITY 1.021 1.005 - 1.030    BLOOD SMALL (A) NEGATIVE    pH 7.0 5.0 - 7.0 Units    PROTEIN(URINE) 30 (A) NEGATIVE mg/dL    UROBILINOGEN 0.2 0.0 - 1.0 mg/dL    NITRITE NEGATIVE NEGATIVE    LEUKOCYTE ESTERASE NEGATIVE NEGATIVE    Squamous EPI'S 1 to 5 0 - 5 /hpf    RBC 1 to 2 0 - 2 /hpf    WBC 1 to 5 0 - 5 /hpf    BACTERIA FEW (A) NONE SEEN /hpf    Hyaline Casts NONE SEEN 0 - 5 /lpf    MUCOUS PRESENT    CBC with Automated Differential    Collection Time: 11/12/19  7:25 PM   Result Value Ref Range    WBC 6.9 4.2 - 11.0 K/mcL    RBC 4.61 4.00 - 5.20 mil/mcL    HGB 14.3 12.0 - 15.5 g/dL    HCT 40.9 36.0 - 46.5 %    MCV 88.7 78.0 - 100.0 fl    MCH 31.0 26.0 - 34.0 pg    MCHC 35.0 32.0 - 36.5 g/dL    RDW-CV 12.5 11.0 - 15.0 %     140 - 450 K/mcL    NRBC 0 0 /100 WBC    DIFF TYPE AUTOMATED DIFFERENTIAL     Neutrophil 69 %    LYMPH 23 %    MONO 7 %    EOSIN 1 %    BASO 0 %    Percent Immature Granuloctyes 0 %    Absolute Neutrophil 4.7 1.8 - 7.7 K/mcL    Absolute Lymph 1.6 1.0 - 4.0 K/mcL    Absolute Mono 0.5 0.3 - 0.9 K/mcL    Absolute Eos 0.1 0.1 - 0.5 K/mcL    Absolute Baso 0.0 0.0 - 0.3 K/mcL    Absolute Immature Granulocytes 0.0 0 - 0.2 K/mcl   COMPREHENSIVE METABOLIC PANEL    Collection Time: 11/12/19  7:25 PM   Result Value Ref Range    Sodium 138 135 - 145  mmol/L    Potassium 3.9 3.4 - 5.1 mmol/L    Chloride 108 (H) 98 - 107 mmol/L    Carbon Dioxide 24 21 - 32 mmol/L    Anion Gap 10 10 - 20 mmol/L    Glucose 82 65 - 99 mg/dL    BUN 6 6 - 20 mg/dL    Creatinine 0.70 0.51 - 0.95 mg/dL    GFR Estimate,  >90     GFR Estimate, Non African American >90     BUN/Creatinine Ratio 9 7 - 25    CALCIUM 9.1 8.4 - 10.2 mg/dL    TOTAL BILIRUBIN 1.2 (H) 0.2 - 1.0 mg/dL    AST/SGOT 18 <38 Units/L    ALT/SGPT 33 <64 Units/L    ALK PHOSPHATASE 67 45 - 117 Units/L    TOTAL PROTEIN 7.2 6.4 - 8.2 g/dL    Albumin 4.1 3.6 - 5.1 g/dL    GLOBULIN 3.1 2.0 - 4.0 g/dL    A/G Ratio, Serum 1.3 1.0 - 2.4   Lipase    Collection Time: 11/12/19  7:25 PM   Result Value Ref Range    Lipase 75 73 - 393 Units/L   Basic Metabolic Panel    Collection Time: 11/13/19  5:15 AM   Result Value Ref Range    Sodium 141 135 - 145 mmol/L    Potassium 3.6 3.4 - 5.1 mmol/L    Chloride 112 (H) 98 - 107 mmol/L    Carbon Dioxide 24 21 - 32 mmol/L    Anion Gap 9 (L) 10 - 20 mmol/L    Glucose 86 65 - 99 mg/dL    BUN 5 (L) 6 - 20 mg/dL    Creatinine 0.75 0.51 - 0.95 mg/dL    GFR Estimate,  >90     GFR Estimate, Non African American >90     BUN/Creatinine Ratio 7 7 - 25    CALCIUM 7.6 (L) 8.4 - 10.2 mg/dL        IMAGING STUDIES:    Imaging studies reviewed.  Xr Abdomen 2 Views    Result Date: 11/12/2019  EXAM: XR ABDOMEN 2 VIEWS CLINICAL INDICATION: Nausea, vomiting, right upper quadrant pain. COMPARISON: CT abdomen and pelvis exam 11/10/2019 and right upper quadrant ultrasound exam 11/10/2019 FINDINGS: Supine and upright views of the abdomen were obtained.  Scattered gas is seen in nondistended bowel extending to the level of the rectum.  No dilated loops of bowel are present to suggest obstruction or ileus.  No pneumoperitoneum is seen in the upright view.  Some scattered surgical clips are seen at the pelvis.  Lung bases are clear.  Postsurgical changes are again seen at the lower lumbar  spine as seen in prior CT imaging.     No radiographic evidence of obstruction or ileus. Electronically Signed by: RADHA VERNON M.D. Signed on: 11/12/2019 7:58 PM          ASSESSMENT:     Is a 50-year-old female with right upper quadrant pain, nausea vomiting, diarrhea over the last month.  1.GI: Patient is having right upper quadrant pain which may be consistent with chronic cholecystitis.  Patient has sludge in her gallbladder.  I meant to get a HIDA scan to evaluate function of the gallbladder.  Her total bili was 1.2 but we do not have fractionation of her total bilirubin.  I will order hepatic function panel at this time.  If her HIDA scan is negative, we will plan on doing an upper endoscopy tomorrow.                  CASE DISCUSSED WITH:  Patient     A copy of this note was sent to the referring provider. Thank you for involving me   in the care of this patient.             - - -

## 2021-03-30 NOTE — ED STATDOCS - INTERNATIONAL TRAVEL
Problem: Patient Centered Care  Goal: Patient preferences are identified and integrated in the patient's plan of care  Description  Interventions:  - What would you like us to know as we care for you?   - Provide timely, complete, and accurate informatio evaluate response  - Implement non-pharmacological measures as appropriate and evaluate response  - Consider cultural and social influences on pain and pain management  - Manage/alleviate anxiety  - Utilize distraction and/or relaxation techniques  - Monit No

## 2021-03-30 NOTE — ED ADULT NURSE NOTE - NSIMPLEMENTINTERV_GEN_ALL_ED
Implemented All Universal Safety Interventions:  [place] to call system. Call bell, personal items and telephone within reach. Instruct patient to call for assistance. Room bathroom lighting operational. Non-slip footwear when patient is off stretcher. Physically safe environment: no spills, clutter or unnecessary equipment. Stretcher in lowest position, wheels locked, appropriate side rails in place.

## 2021-03-30 NOTE — ED STATDOCS - PMH
Cardiac arrhythmia  H/O: bigemeny and trigemeny  Fracture of right lower extremity  condyle and tibial plateau fracture  Hearing loss of right ear, unspecified hearing loss type    Hypothyroidism, acquired  s/p thyroidectomy  Knee pain, right    Osteoporosis    Pancreatitis  2015  Prolapse of vaginal wall    SVT (supraventricular tachycardia)  H/O  Thyroid cancer  s/p thyroidectomy

## 2021-03-30 NOTE — ED STATDOCS - NSFOLLOWUPINSTRUCTIONS_ED_ALL_ED_FT
pt is well appearing on dc, pt advised to stay home, quarantine and to fu with testing results, pt knows to return to ed for any worsening of symptoms including chest pain, sob, dyspnea or any other complaints. pt advised to increase hydration and fluids and to take  Tylenol for symptoms if needed. pt knows to return to ed for any worsening of symptoms.

## 2021-03-30 NOTE — ED STATDOCS - ATTENDING CONTRIBUTION TO CARE
I,Samuel Grey MD,  performed the initial face to face bedside interview with this patient regarding history of present illness, review of symptoms and relevant past medical, social and family history.  I completed an independent physical examination.  I was the initial provider who evaluated this patient. I have signed out the follow up of any pending tests (i.e. labs, radiological studies) to the ACP.  I have communicated the patient’s plan of care and disposition with the ACP.  The history, relevant review of systems, past medical and surgical history, medical decision making, and physical examination was documented by the scribe in my presence and I attest to the accuracy of the documentation.

## 2021-03-30 NOTE — ED STATDOCS - OBJECTIVE STATEMENT
79 y/o female with PMHx of pancreatitis, thyroid CA s/p thyroidectomy, RLE fx, osteoporosis, cardiac arrhythmia, SVT, hypothyroidism, s/p arthroscopy left knee, s/p cholecystectomy presents to the ED sent in by MD Dr. Moise for 4-5 days of fatigue.  with similar sx.  and wife both tested COVID post [place]. Pt c/o lightheadedness this morning. Denies CP, SOB. No cough, or any other respiratory sx.

## 2021-03-30 NOTE — ED STATDOCS - PATIENT PORTAL LINK FT
You can access the FollowMyHealth Patient Portal offered by [facility] by registering at the following website: [email or web address]. By joining [employer]’s FollowMyHealth portal, you will also be able to view your health information using other applications (apps) compatible with our system.

## 2021-03-30 NOTE — ED STATDOCS - PROGRESS NOTE DETAILS
spoke to pt at length and educated her on monoclonial antibody treatment for COVID, pt states she would like to receive it now. pt denies any other complaints currently. will order and reeval caron Cunha PA-C called pt pmd and left message for call back for covid results. -Cony Cunha PA-C pt reeval awaiting covid results for BAM treatment. -Cony Cunha PA-C called our lab and results are still pending covid results. pt states she will come back tomm for infusion, pt understandable and accepting of results pending for so many hours, she states she is just tired and wants to go home she knows she would have to wait hours for the transfusion and then for observation. -Cony Cunha PA-C

## 2021-03-31 ENCOUNTER — EMERGENCY (EMERGENCY)
Facility: HOSPITAL | Age: 81
LOS: 0 days | Discharge: HOME | End: 2021-03-31
Attending: EMERGENCY MEDICINE
Payer: MEDICARE

## 2021-03-31 ENCOUNTER — TRANSCRIPTION ENCOUNTER (OUTPATIENT)
Age: 81
End: 2021-03-31

## 2021-03-31 VITALS — WEIGHT: 145.06 LBS | HEIGHT: 62 IN

## 2021-03-31 VITALS
HEART RATE: 85 BPM | OXYGEN SATURATION: 94 % | TEMPERATURE: 98 F | SYSTOLIC BLOOD PRESSURE: 110 MMHG | RESPIRATION RATE: 17 BRPM | DIASTOLIC BLOOD PRESSURE: 79 MMHG

## 2021-03-31 DIAGNOSIS — I47.1 SUPRAVENTRICULAR TACHYCARDIA: ICD-10-CM

## 2021-03-31 DIAGNOSIS — E89.2 POSTPROCEDURAL HYPOPARATHYROIDISM: Chronic | ICD-10-CM

## 2021-03-31 DIAGNOSIS — Z98.890 OTHER SPECIFIED POSTPROCEDURAL STATES: Chronic | ICD-10-CM

## 2021-03-31 DIAGNOSIS — U07.1 COVID-19: ICD-10-CM

## 2021-03-31 DIAGNOSIS — Z90.49 ACQUIRED ABSENCE OF OTHER SPECIFIED PARTS OF DIGESTIVE TRACT: Chronic | ICD-10-CM

## 2021-03-31 DIAGNOSIS — I49.9 CARDIAC ARRHYTHMIA, UNSPECIFIED: ICD-10-CM

## 2021-03-31 DIAGNOSIS — N81.10 CYSTOCELE, UNSPECIFIED: ICD-10-CM

## 2021-03-31 DIAGNOSIS — H91.91 UNSPECIFIED HEARING LOSS, RIGHT EAR: ICD-10-CM

## 2021-03-31 DIAGNOSIS — M19.90 UNSPECIFIED OSTEOARTHRITIS, UNSPECIFIED SITE: ICD-10-CM

## 2021-03-31 DIAGNOSIS — Z87.19 PERSONAL HISTORY OF OTHER DISEASES OF THE DIGESTIVE SYSTEM: ICD-10-CM

## 2021-03-31 DIAGNOSIS — E03.9 HYPOTHYROIDISM, UNSPECIFIED: ICD-10-CM

## 2021-03-31 DIAGNOSIS — M81.0 AGE-RELATED OSTEOPOROSIS WITHOUT CURRENT PATHOLOGICAL FRACTURE: ICD-10-CM

## 2021-03-31 DIAGNOSIS — E89.0 POSTPROCEDURAL HYPOTHYROIDISM: Chronic | ICD-10-CM

## 2021-03-31 DIAGNOSIS — Z85.850 PERSONAL HISTORY OF MALIGNANT NEOPLASM OF THYROID: ICD-10-CM

## 2021-03-31 LAB — SARS-COV-2 RNA SPEC QL NAA+PROBE: DETECTED

## 2021-03-31 PROCEDURE — 99283 EMERGENCY DEPT VISIT LOW MDM: CPT | Mod: CS

## 2021-03-31 PROCEDURE — 99282 EMERGENCY DEPT VISIT SF MDM: CPT

## 2021-03-31 NOTE — ED ADULT TRIAGE NOTE - CHIEF COMPLAINT QUOTE
Pt was dx with Covid last night in ED. Pt states that it was already so late that she felt exhausted and needed to go home. Per pt, she and her  were told to go home and come back at 10 am to ED for "antibody infusion."

## 2021-03-31 NOTE — ED STATDOCS - PATIENT PORTAL LINK FT
You can access the FollowMyHealth Patient Portal offered by [facility] by registering at the following website: [email or web address]. By joining [employer]’s FollowMyHealth portal, you will also be able to view your health information using other applications (apps) compatible with our system.

## 2021-03-31 NOTE — ED STATDOCS - OBJECTIVE STATEMENT
79 y/o F with PMHx of thyroid CA s/p thyroidectomy, SVT, hypothyroid, pancreatitis, OA, s/p cholecystectomy, s/p parathyroidectomy, and COVID-19+ as of yesterday presents to the ED requesting MAB therapy. Was seen in [facility] yesterday for the same but left prior to receiving infusion. Notes +fatigue no worse than yesterday. No SOB, chest pain, or fever. NKDA. PCP: Dr. Zack Moise.

## 2021-03-31 NOTE — ED STATDOCS - NSCAREINITIATED _GEN_ER
Alexandrea Lucas) Eye Protection Verbiage: Before proceeding with the stage, a plastic scleral shield was inserted. The globe was anesthetized with a few drops of 1% lidocaine with 1:100,000 epinephrine. Then, an appropriate sized scleral shield was chosen and coated with lacrilube ointment. The shield was gently inserted and left in place for the duration of each stage. After the stage was completed, the shield was gently removed.

## 2021-03-31 NOTE — ED STATDOCS - NSFOLLOWUPINSTRUCTIONS_ED_ALL_ED_FT
COVID-19  COVID-19, also known as coronavirus disease or novel coronavirus, is caused by a type of virus that causes respiratory illness. This may lead to inflammation and the buildup of mucus and fluids in the airway of the lungs (pneumonia). There are many different coronaviruses. Most of these viruses only affect animals, but sometimes these viruses can change and infect people.  What are the causes?  This illness is caused by a virus. You may catch the virus by:  Breathing in droplets from an infected person's cough or sneeze.Touching something, like a table or a doorknob, that was exposed to the virus (contaminated) and then touching your mouth, nose, or eyes.Being around animals that carry the virus, or eating uncooked or undercooked meat or animal products that contain the virus.What increases the risk?  You are more likely to develop this condition if you:  Live in or travel to an area with a COVID-19 outbreak.Come in contact with a sick person who recently traveled to an area with a COVID-19 outbreak.Provide care for or live with a person who is infected with COVID-19.What are the signs or symptoms?  COVID-19 causes respiratory illness that can lead to pneumonia. Symptoms of pneumonia may include:  A fever.A cough.Difficulty breathing.How is this diagnosed?  This condition may be diagnosed based on:  Your signs and symptoms, especially if:  You live in an area with a COVID-19 outbreak.You recently traveled to or from an area where the virus is common.You provide care for or live with a person who was diagnosed with COVID-19.A physical exam.Lab tests, which may include:  A nasal swab to take a sample of fluid from your nose.A throat swab to take a sample of fluid from your throat.A sample of mucus from your lungs (sputum).Blood tests.How is this treated?  There is no medicine to treat COVID-19. Your health care provider will talk with you about ways to treat your symptoms. This may include rest, fluids, and over-the-counter medicines.  Follow these instructions at home:  Lifestyle     Use a cool-mist humidifier to add moisture to the air. This can help you breathe more easily.Do not use any products that contain nicotine or tobacco, such as cigarettes, e-cigarettes, and chewing tobacco. If you need help quitting, ask your health care provider.Rest at home as told by your health care provider.Return to your normal activities as told by your health care provider. Ask your health care provider what activities are safe for you.General instructions     Take over-the-counter and prescription medicines only as told by your health care provider.Drink enough fluid to keep your urine pale yellow.Keep all follow-up visits as told by your health care provider. This is important.How is this prevented?     There is no vaccine to help prevent COVID-19 infection. However, there are steps you can take to protect yourself and others from this virus.  To protect yourself:     Do not travel to areas where COVID-19 is a risk. The areas where COVID-19 is reported change often. To identify high-risk areas, check the CDC travel website: wwwnc.cdc.gov/travel/noticesIf you live in, or must travel to, an area where COVID-19 is a risk, take precautions to avoid infection.  Stay away from people who are sick.Stay away from places where there are animals that may carry the virus. This includes places where animals and animal products are sold. Note that both living and dead animals can carry the virus.Wash your hands often with soap and water. If soap and water are not available, use an alcohol-based hand .Avoid touching your mouth, face, eyes, or nose.To protect others:     If you have symptoms, take steps to prevent the virus from spreading to others.  If you think you have a COVID-19 infection, contact your health care provider right away. Tell your health care team that you think you may have a COVID-19 infection.Stay home. Leave your house only to seek medical care.Do not travel while you are sick.Wash your hands often with soap and water. If soap and water are not available, use alcohol-based hand .Stay away from other members of your household. If possible, stay in your own room, separate from others. Use a different bathroom.Make sure that all people in your household wash their hands well and often.Cough or sneeze into a tissue or your sleeve or elbow. Do not cough or sneeze into your hand or into the air.Wear a face mask.Where to find more information  Centers for Disease Control and Prevention: www.cdc.gov/coronavirus/2019-ncov/index.htmlWSwedish [facility]: www.who.int/health-topics/coronavirusContact a health care provider if:  You have traveled to an area where COVID-19 is a risk and you have symptoms of the infection.You have contact with someone who has traveled to an area where COVID-19 is a risk and you have symptoms of the infection.Get help right away if:  You have trouble breathing.You have chest pain.Summary  COVID-19 is caused by a type of virus that causes respiratory illness. This may lead to inflammation and the buildup of mucus and fluids in the airway of the lungs (pneumonia).You are more likely to develop this condition if you live in or travel to an area with a COVID-19 outbreak.There is no medicine to treat COVID-19. Your health care provider will talk with you about ways to treat your symptoms.Take steps to protect yourself and others from infection. Wash your hands often. Stay away from other people who are sick and wear a mask if you are sick.

## 2021-03-31 NOTE — ED STATDOCS - PROGRESS NOTE DETAILS
81 y/o F presents for monoclonal infusion. Seen yesterday and left prior to infusion. Reports baseline fatigue. No other complaitns at this time. -Dragan Lucas PA-C Do not currently have staffing for infusion. Information put in for outpt infusion. -Dragan Lucas PA-C

## 2021-03-31 NOTE — ED STATDOCS - ATTENDING CONTRIBUTION TO CARE
I,Samuel Grye MD,  performed the initial face to face bedside interview with this patient regarding history of present illness, review of symptoms and relevant past medical, social and family history.  I completed an independent physical examination.  I was the initial provider who evaluated this patient. I have signed out the follow up of any pending tests (i.e. labs, radiological studies) to the ACP.  I have communicated the patient’s plan of care and disposition with the ACP.  The history, relevant review of systems, past medical and surgical history, medical decision making, and physical examination was documented by the scribe in my presence and I attest to the accuracy of the documentation.

## 2021-04-01 ENCOUNTER — OUTPATIENT (OUTPATIENT)
Dept: OUTPATIENT SERVICES | Facility: HOSPITAL | Age: 81
LOS: 1 days | End: 2021-04-01
Payer: MEDICARE

## 2021-04-01 ENCOUNTER — APPOINTMENT (OUTPATIENT)
Dept: DISASTER EMERGENCY | Facility: HOSPITAL | Age: 81
End: 2021-04-01

## 2021-04-01 ENCOUNTER — TRANSCRIPTION ENCOUNTER (OUTPATIENT)
Age: 81
End: 2021-04-01

## 2021-04-01 VITALS
HEART RATE: 66 BPM | TEMPERATURE: 98 F | OXYGEN SATURATION: 97 % | RESPIRATION RATE: 18 BRPM | SYSTOLIC BLOOD PRESSURE: 121 MMHG | DIASTOLIC BLOOD PRESSURE: 66 MMHG

## 2021-04-01 VITALS
OXYGEN SATURATION: 97 % | SYSTOLIC BLOOD PRESSURE: 125 MMHG | RESPIRATION RATE: 18 BRPM | DIASTOLIC BLOOD PRESSURE: 72 MMHG | HEART RATE: 77 BPM | WEIGHT: 145.06 LBS | TEMPERATURE: 97 F | HEIGHT: 62 IN

## 2021-04-01 DIAGNOSIS — Z98.890 OTHER SPECIFIED POSTPROCEDURAL STATES: Chronic | ICD-10-CM

## 2021-04-01 DIAGNOSIS — Z90.49 ACQUIRED ABSENCE OF OTHER SPECIFIED PARTS OF DIGESTIVE TRACT: Chronic | ICD-10-CM

## 2021-04-01 DIAGNOSIS — E89.2 POSTPROCEDURAL HYPOPARATHYROIDISM: Chronic | ICD-10-CM

## 2021-04-01 DIAGNOSIS — E89.0 POSTPROCEDURAL HYPOTHYROIDISM: Chronic | ICD-10-CM

## 2021-04-01 DIAGNOSIS — U07.1 COVID-19: ICD-10-CM

## 2021-04-01 RX ORDER — SODIUM CHLORIDE 9 MG/ML
250 INJECTION INTRAMUSCULAR; INTRAVENOUS; SUBCUTANEOUS
Refills: 0 | Status: DISCONTINUED | OUTPATIENT
Start: 2021-04-01 | End: 2021-04-15

## 2021-04-01 RX ADMIN — SODIUM CHLORIDE 25 MILLILITER(S): 9 INJECTION INTRAMUSCULAR; INTRAVENOUS; SUBCUTANEOUS at 10:50

## 2021-04-01 NOTE — CHART NOTE - NSCHARTNOTEFT_GEN_A_CORE
CC: Monoclonal Antibody Infusion/COVID 19 Positive      History: Patient presents for infusion of monoclonal antibody infusion. Patient has been screened and was deemed to be a candidate.    Symptoms/ Criteria:     Inclusion Criteria: Age > 65  Positive Test Date: 3/30/31  Date of Symptom Onset: 3/28/31    casirivimab/imdevimab in sodium chloride 0.9% (EUA) IVPB 250 milliLiter(s) IV Intermittent once  sodium chloride 0.9%. 250 milliLiter(s) IV Continuous <Continuous>      PMHx:  Infection due to severe acute respiratory syndrome coronavirus 2 (SARS-CoV-2)    Family history non-contributory    Hypothyroidism, acquired    SVT (supraventricular tachycardia)    Cardiac arrhythmia    Osteoporosis    Knee pain, right    Fracture of right lower extremity    Hearing loss of right ear, unspecified hearing loss type    Thyroid cancer    Pancreatitis    Prolapse of vaginal wall    S/P thyroidectomy    S/P parathyroidectomy    S/P cholecystectomy    S/P arthroscopy of left knee    S/P colonoscopy          T(C): 36.3 (04-01-21 @ 10:27), Max: 36.3 (04-01-21 @ 10:27)  HR: 77 (04-01-21 @ 10:27) (77 - 77)  BP: 125/72 (04-01-21 @ 10:27) (125/72 - 125/72)  RR: 18 (04-01-21 @ 10:27) (18 - 18)  SpO2: 97% (04-01-21 @ 10:27) (97% - 97%)      PE:   Appearance: NAD	  HEENT:   Normal oral mucosa.   Lymphatic: No lymphadenopathy  Cardiovascular: Normal S1 S2, No JVD, No murmurs, No edema  Respiratory: Lungs clear to auscultation	  Gastrointestinal:  Soft, Non-tender. No guarding   Skin: warm and dry  Neurologic: Non-focal  Extremities: Normal range of motion.     ASSESSMENT:  Pt is a 80y year old Female with PMH Covid +  referred by Dragan Lucas to the infusion center for Monoclonal antibody infusion (Regeneron).        PLAN:  - infusion procedure explained to patient   - Consent for monoclonal antibody infusion obtained   - Risk & benefits discussed/all questions answered  - infuse Regeneron over one hour   - will observe patient for one hour post infusion  and then if stable discharge home with oupt follow up as planned by PMD.    POST INFUSION ASSESSMENT:   DISCHARGE at approximately  XXX  hours    - Patient tolerated infusion well denies complaints of chest pain/SOB/dizzines/ palps  - VSS for discharge home  - D/C instructions given/ fact sheet included.  - Patient to follow-up with PCP as needed. CC: Monoclonal Antibody Infusion/COVID 19 Positive      History: Patient presents for infusion of monoclonal antibody infusion. Patient has been screened and was deemed to be a candidate.    Symptoms/ Criteria:     Inclusion Criteria: Age > 65  Positive Test Date: 3/30/31  Date of Symptom Onset: 3/28/31    casirivimab/imdevimab in sodium chloride 0.9% (EUA) IVPB 250 milliLiter(s) IV Intermittent once  sodium chloride 0.9%. 250 milliLiter(s) IV Continuous <Continuous>      PMHx:  Infection due to severe acute respiratory syndrome coronavirus 2 (SARS-CoV-2)    Family history non-contributory    Hypothyroidism, acquired    SVT (supraventricular tachycardia)    Cardiac arrhythmia    Osteoporosis    Knee pain, right    Fracture of right lower extremity    Hearing loss of right ear, unspecified hearing loss type    Thyroid cancer    Pancreatitis    Prolapse of vaginal wall    S/P thyroidectomy    S/P parathyroidectomy    S/P cholecystectomy    S/P arthroscopy of left knee    S/P colonoscopy          T(C): 36.3 (04-01-21 @ 10:27), Max: 36.3 (04-01-21 @ 10:27)  HR: 77 (04-01-21 @ 10:27) (77 - 77)  BP: 125/72 (04-01-21 @ 10:27) (125/72 - 125/72)  RR: 18 (04-01-21 @ 10:27) (18 - 18)  SpO2: 97% (04-01-21 @ 10:27) (97% - 97%)      PE:   Appearance: NAD	  HEENT:   Normal oral mucosa.   Lymphatic: No lymphadenopathy  Cardiovascular: Normal S1 S2, No JVD, No murmurs, No edema  Respiratory: Lungs clear to auscultation	  Gastrointestinal:  Soft, Non-tender. No guarding   Skin: warm and dry  Neurologic: Non-focal  Extremities: Normal range of motion.     ASSESSMENT:  Pt is a 80y year old Female with PMH Covid +  referred by Dragan Lucas to the infusion center for Monoclonal antibody infusion (Regeneron).        PLAN:  - infusion procedure explained to patient   - Consent for monoclonal antibody infusion obtained   - Risk & benefits discussed/all questions answered  - infuse Regeneron over one hour   - will observe patient for one hour post infusion  and then if stable discharge home with oupt follow up as planned by PMD.    POST INFUSION ASSESSMENT:   DISCHARGE at approximately  1310  hours    - Patient tolerated infusion well denies complaints of chest pain/SOB/dizzines/ palps  - VSS for discharge home  - D/C instructions given/ fact sheet included.  - Patient to follow-up with PCP as needed.

## 2021-04-02 ENCOUNTER — TRANSCRIPTION ENCOUNTER (OUTPATIENT)
Age: 81
End: 2021-04-02

## 2021-04-02 PROCEDURE — M0239: CPT

## 2021-04-05 ENCOUNTER — TRANSCRIPTION ENCOUNTER (OUTPATIENT)
Age: 81
End: 2021-04-05

## 2021-04-07 ENCOUNTER — INPATIENT (INPATIENT)
Facility: HOSPITAL | Age: 81
LOS: 1 days | Discharge: HOME | DRG: 308 | End: 2021-04-09
Attending: HOSPITALIST | Admitting: INTERNAL MEDICINE
Payer: MEDICARE

## 2021-04-07 VITALS
RESPIRATION RATE: 20 BRPM | OXYGEN SATURATION: 99 % | HEART RATE: 61 BPM | DIASTOLIC BLOOD PRESSURE: 59 MMHG | SYSTOLIC BLOOD PRESSURE: 118 MMHG

## 2021-04-07 DIAGNOSIS — Z98.890 OTHER SPECIFIED POSTPROCEDURAL STATES: Chronic | ICD-10-CM

## 2021-04-07 DIAGNOSIS — E89.0 POSTPROCEDURAL HYPOTHYROIDISM: Chronic | ICD-10-CM

## 2021-04-07 DIAGNOSIS — U07.1 COVID-19: ICD-10-CM

## 2021-04-07 DIAGNOSIS — E89.2 POSTPROCEDURAL HYPOPARATHYROIDISM: Chronic | ICD-10-CM

## 2021-04-07 DIAGNOSIS — Z90.49 ACQUIRED ABSENCE OF OTHER SPECIFIED PARTS OF DIGESTIVE TRACT: Chronic | ICD-10-CM

## 2021-04-07 LAB
ADD ON TEST-SPECIMEN IN LAB: SIGNIFICANT CHANGE UP
ALBUMIN SERPL ELPH-MCNC: 3.6 G/DL — SIGNIFICANT CHANGE UP (ref 3.3–5)
ALP SERPL-CCNC: 40 U/L — SIGNIFICANT CHANGE UP (ref 40–120)
ALT FLD-CCNC: 35 U/L — SIGNIFICANT CHANGE UP (ref 12–78)
ANION GAP SERPL CALC-SCNC: 6 MMOL/L — SIGNIFICANT CHANGE UP (ref 5–17)
AST SERPL-CCNC: 35 U/L — SIGNIFICANT CHANGE UP (ref 15–37)
BASOPHILS # BLD AUTO: 0.04 K/UL — SIGNIFICANT CHANGE UP (ref 0–0.2)
BASOPHILS NFR BLD AUTO: 0.8 % — SIGNIFICANT CHANGE UP (ref 0–2)
BILIRUB SERPL-MCNC: 0.6 MG/DL — SIGNIFICANT CHANGE UP (ref 0.2–1.2)
BUN SERPL-MCNC: 18 MG/DL — SIGNIFICANT CHANGE UP (ref 7–23)
CALCIUM SERPL-MCNC: 8.7 MG/DL — SIGNIFICANT CHANGE UP (ref 8.5–10.1)
CHLORIDE SERPL-SCNC: 110 MMOL/L — HIGH (ref 96–108)
CO2 SERPL-SCNC: 22 MMOL/L — SIGNIFICANT CHANGE UP (ref 22–31)
CREAT SERPL-MCNC: 1.27 MG/DL — SIGNIFICANT CHANGE UP (ref 0.5–1.3)
CRP SERPL-MCNC: <3 MG/L — SIGNIFICANT CHANGE UP
EOSINOPHIL # BLD AUTO: 0.12 K/UL — SIGNIFICANT CHANGE UP (ref 0–0.5)
EOSINOPHIL NFR BLD AUTO: 2.3 % — SIGNIFICANT CHANGE UP (ref 0–6)
FERRITIN SERPL-MCNC: 133 NG/ML — SIGNIFICANT CHANGE UP (ref 15–150)
GLUCOSE SERPL-MCNC: 171 MG/DL — HIGH (ref 70–99)
HCT VFR BLD CALC: 45.6 % — HIGH (ref 34.5–45)
HGB BLD-MCNC: 15.2 G/DL — SIGNIFICANT CHANGE UP (ref 11.5–15.5)
IMM GRANULOCYTES NFR BLD AUTO: 0.4 % — SIGNIFICANT CHANGE UP (ref 0–1.5)
LYMPHOCYTES # BLD AUTO: 1.33 K/UL — SIGNIFICANT CHANGE UP (ref 1–3.3)
LYMPHOCYTES # BLD AUTO: 25.5 % — SIGNIFICANT CHANGE UP (ref 13–44)
MCHC RBC-ENTMCNC: 29.5 PG — SIGNIFICANT CHANGE UP (ref 27–34)
MCHC RBC-ENTMCNC: 33.3 GM/DL — SIGNIFICANT CHANGE UP (ref 32–36)
MCV RBC AUTO: 88.4 FL — SIGNIFICANT CHANGE UP (ref 80–100)
MONOCYTES # BLD AUTO: 0.38 K/UL — SIGNIFICANT CHANGE UP (ref 0–0.9)
MONOCYTES NFR BLD AUTO: 7.3 % — SIGNIFICANT CHANGE UP (ref 2–14)
NEUTROPHILS # BLD AUTO: 3.33 K/UL — SIGNIFICANT CHANGE UP (ref 1.8–7.4)
NEUTROPHILS NFR BLD AUTO: 63.7 % — SIGNIFICANT CHANGE UP (ref 43–77)
PLATELET # BLD AUTO: 226 K/UL — SIGNIFICANT CHANGE UP (ref 150–400)
POTASSIUM SERPL-MCNC: 4.7 MMOL/L — SIGNIFICANT CHANGE UP (ref 3.5–5.3)
POTASSIUM SERPL-SCNC: 4.7 MMOL/L — SIGNIFICANT CHANGE UP (ref 3.5–5.3)
PROCALCITONIN SERPL-MCNC: 0.06 NG/ML — SIGNIFICANT CHANGE UP (ref 0.02–0.1)
PROT SERPL-MCNC: 7.1 GM/DL — SIGNIFICANT CHANGE UP (ref 6–8.3)
RBC # BLD: 5.16 M/UL — SIGNIFICANT CHANGE UP (ref 3.8–5.2)
RBC # FLD: 11.9 % — SIGNIFICANT CHANGE UP (ref 10.3–14.5)
SODIUM SERPL-SCNC: 138 MMOL/L — SIGNIFICANT CHANGE UP (ref 135–145)
WBC # BLD: 5.22 K/UL — SIGNIFICANT CHANGE UP (ref 3.8–10.5)
WBC # FLD AUTO: 5.22 K/UL — SIGNIFICANT CHANGE UP (ref 3.8–10.5)

## 2021-04-07 PROCEDURE — 93306 TTE W/DOPPLER COMPLETE: CPT

## 2021-04-07 PROCEDURE — 85379 FIBRIN DEGRADATION QUANT: CPT

## 2021-04-07 PROCEDURE — 99223 1ST HOSP IP/OBS HIGH 75: CPT

## 2021-04-07 PROCEDURE — 93010 ELECTROCARDIOGRAM REPORT: CPT

## 2021-04-07 PROCEDURE — 83036 HEMOGLOBIN GLYCOSYLATED A1C: CPT

## 2021-04-07 PROCEDURE — 99285 EMERGENCY DEPT VISIT HI MDM: CPT | Mod: CS

## 2021-04-07 PROCEDURE — 71045 X-RAY EXAM CHEST 1 VIEW: CPT | Mod: 26

## 2021-04-07 PROCEDURE — 84443 ASSAY THYROID STIM HORMONE: CPT

## 2021-04-07 PROCEDURE — 86769 SARS-COV-2 COVID-19 ANTIBODY: CPT

## 2021-04-07 PROCEDURE — 36415 COLL VENOUS BLD VENIPUNCTURE: CPT

## 2021-04-07 RX ORDER — DILTIAZEM HCL 120 MG
10 CAPSULE, EXT RELEASE 24 HR ORAL ONCE
Refills: 0 | Status: COMPLETED | OUTPATIENT
Start: 2021-04-07 | End: 2021-04-07

## 2021-04-07 RX ORDER — LEVOTHYROXINE SODIUM 125 MCG
100 TABLET ORAL DAILY
Refills: 0 | Status: DISCONTINUED | OUTPATIENT
Start: 2021-04-07 | End: 2021-04-09

## 2021-04-07 RX ORDER — LANOLIN ALCOHOL/MO/W.PET/CERES
5 CREAM (GRAM) TOPICAL ONCE
Refills: 0 | Status: COMPLETED | OUTPATIENT
Start: 2021-04-07 | End: 2021-04-07

## 2021-04-07 RX ORDER — VERAPAMIL HCL 240 MG
120 CAPSULE, EXTENDED RELEASE PELLETS 24 HR ORAL DAILY
Refills: 0 | Status: DISCONTINUED | OUTPATIENT
Start: 2021-04-07 | End: 2021-04-09

## 2021-04-07 RX ORDER — VERAPAMIL HCL 240 MG
1 CAPSULE, EXTENDED RELEASE PELLETS 24 HR ORAL
Qty: 0 | Refills: 0 | DISCHARGE

## 2021-04-07 RX ORDER — APIXABAN 2.5 MG/1
5 TABLET, FILM COATED ORAL
Refills: 0 | Status: DISCONTINUED | OUTPATIENT
Start: 2021-04-07 | End: 2021-04-09

## 2021-04-07 RX ORDER — ONDANSETRON 8 MG/1
4 TABLET, FILM COATED ORAL EVERY 6 HOURS
Refills: 0 | Status: DISCONTINUED | OUTPATIENT
Start: 2021-04-07 | End: 2021-04-09

## 2021-04-07 RX ORDER — METOPROLOL TARTRATE 50 MG
25 TABLET ORAL EVERY 8 HOURS
Refills: 0 | Status: DISCONTINUED | OUTPATIENT
Start: 2021-04-07 | End: 2021-04-08

## 2021-04-07 RX ORDER — SODIUM CHLORIDE 9 MG/ML
1000 INJECTION INTRAMUSCULAR; INTRAVENOUS; SUBCUTANEOUS ONCE
Refills: 0 | Status: COMPLETED | OUTPATIENT
Start: 2021-04-07 | End: 2021-04-07

## 2021-04-07 RX ADMIN — Medication 5 MILLIGRAM(S): at 23:14

## 2021-04-07 RX ADMIN — Medication 25 MILLIGRAM(S): at 23:14

## 2021-04-07 RX ADMIN — Medication 25 MILLIGRAM(S): at 16:24

## 2021-04-07 RX ADMIN — SODIUM CHLORIDE 1000 MILLILITER(S): 9 INJECTION INTRAMUSCULAR; INTRAVENOUS; SUBCUTANEOUS at 10:15

## 2021-04-07 RX ADMIN — APIXABAN 5 MILLIGRAM(S): 2.5 TABLET, FILM COATED ORAL at 19:03

## 2021-04-07 RX ADMIN — Medication 10 MILLIGRAM(S): at 10:15

## 2021-04-07 NOTE — ED PROVIDER NOTE - CONSTITUTIONAL, MLM
normal... Ill appearing , awake, alert, oriented to person, place, time/situation and in no apparent distress.

## 2021-04-07 NOTE — H&P ADULT - HISTORY OF PRESENT ILLNESS
79 y/o female with a PMHx of Cardiac arrhythmia  H/O: bigeminy and trigeminy, Thyroid CA s/p thyroidectomy, SVT, Hypothyroid, Pancreatitis, OA, s/p cholecystectomy, COVID-19+ on 03/30/21 s/p monoclonal antibody infusion on 03/01/21 presenting with weakness and SOB worsening the past few days.  is currently inpatient at [facility] for COVID related sx. Denies fever, vomiting, diarrhea. 79 y/o female with a PMHx of Cardiac arrhythmia  H/O: bigeminy and trigeminy, Thyroid CA s/p thyroidectomy, SVT, Hypothyroid, Pancreatitis, OA, s/p cholecystectomy, COVID-19+ on 03/30/21 s/p monoclonal antibody infusion on 03/01/21 presenting with weakness and SOB worsening the past few days.  is currently inpatient at [facility] for COVID related sx. Denies fever, vomiting, diarrhea.  She was weak and found in AF pt almost LOC; adm for AF RVR dehydration

## 2021-04-07 NOTE — ED PROVIDER NOTE - NS_ ATTENDINGSCRIBEDETAILS _ED_A_ED_FT
Lizzy Barriga MD: The history, relevant review of systems, past medical and surgical history, medical decision making, and physical examination was documented by the scribe in my presence and I attest to the accuracy of the documentation.

## 2021-04-07 NOTE — ED ADULT NURSE NOTE - NSIMPLEMENTINTERV_GEN_ALL_ED
Implemented All Fall Risk Interventions:  [place] to call system. Call bell, personal items and telephone within reach. Instruct patient to call for assistance. Room bathroom lighting operational. Non-slip footwear when patient is off stretcher. Physically safe environment: no spills, clutter or unnecessary equipment. Stretcher in lowest position, wheels locked, appropriate side rails in place. Provide visual cue, wrist band, yellow gown, etc. Monitor gait and stability. Monitor for mental status changes and reorient to person, place, and time. Review medications for side effects contributing to fall risk. Reinforce activity limits and safety measures with patient and family.

## 2021-04-07 NOTE — H&P ADULT - NSICDXPASTSURGICALHX_GEN_ALL_CORE_FT
PAST SURGICAL HISTORY:  S/P arthroscopy of left knee 2016    S/P cholecystectomy 2015    S/P colonoscopy unsure of all dates; last one 2016    S/P parathyroidectomy 12/2005    S/P thyroidectomy 12/2005

## 2021-04-07 NOTE — ED ADULT NURSE NOTE - OBJECTIVE STATEMENT
ambulatory into ED with c/o dizziness and generalized fatigue, c/o "chest disvomfort". Pt states she and her family tested positive for covid 3/29/21

## 2021-04-07 NOTE — H&P ADULT - ASSESSMENT
* PAF  had AF few years ago  start PO BB  pt on Verapamil at home- continue  might need DCC; consult cardio  start Eliquis     * Known COVID 1 week ago, vaccinated, not hypoxic, no treatment   * PAF  had AF few years ago  start PO BB  pt on Verapamil at home- continue  might need DCC; consult cardio- sees Dr Ennis  start Eliquis     * Known COVID 1 week ago, vaccinated, not hypoxic, no treatment    case d/w daughter RN: [phone]; no code status

## 2021-04-07 NOTE — H&P ADULT - NSHPPHYSICALEXAM_GEN_ALL_CORE
· CONSTITUTIONAL: Ill appearing , awake, alert, oriented to person, place, time/situation and in no apparent distress.  · ENMT: Airway patent, Nasal mucosa clear. Mouth with normal mucosa. Throat has no vesicles, no oropharyngeal exudates and uvula is midline.  · EYES: Clear bilaterally, pupils equal, round and reactive to light.  · CARDIAC: Tachycardic rate, irregularly irregular rhythm.  Heart sounds S1, S2.  No murmurs, rubs or gallops.  · RESPIRATORY: Breath sounds clear and equal bilaterally.  · GASTROINTESTINAL: Abdomen soft, non-tender, no guarding.  · MUSCULOSKELETAL: Spine appears normal, range of motion is not limited, no muscle or joint tenderness  · NEUROLOGICAL: Alert and oriented, no focal deficits, no motor or sensory deficits.  · SKIN: Skin normal color for race, warm, dry and intact. No evidence of rash.

## 2021-04-07 NOTE — ED ADULT TRIAGE NOTE - CHIEF COMPLAINT QUOTE
Pt arrives to ED complaining of weakness, dizziness, "some confusion" as per daughter. Pt is +COVID from last Monday. Hx arrhythmia. pt alert and oriented x 4.

## 2021-04-07 NOTE — ED PROVIDER NOTE - OBJECTIVE STATEMENT
81 y/o female with a PMHx of Thyroid CA s/p thyroidectomy, SVT, Hypothyroid, Pancreatitis, OA, s/p cholecystectomy, COVID-19+ on 03/30/21 s/p monoclonal antibody infusion on 03/01/21 presenting with weakness and SOB worsening the past few days.  is currently inpatient at [facility] for COVID related sx. Denies fever, vomiting, diarrhea. 79 y/o female with a PMHx of Cardiac arrhythmia  H/O: bigeminy and trigeminy, Thyroid CA s/p thyroidectomy, SVT, Hypothyroid, Pancreatitis, OA, s/p cholecystectomy, COVID-19+ on 03/30/21 s/p monoclonal antibody infusion on 03/01/21 presenting with weakness and SOB worsening the past few days.  is currently inpatient at [facility] for COVID related sx. Denies fever, vomiting, diarrhea.

## 2021-04-07 NOTE — H&P ADULT - NSHPLABSRESULTS_GEN_ALL_CORE
15.2   5.22  )-----------( 226      ( 07 Apr 2021 10:04 )             45.6   04-07    138  |  110<H>  |  18  ----------------------------<  171<H>  4.7   |  22  |  1.27    Ca    8.7      07 Apr 2021 10:04    TPro  7.1  /  Alb  3.6  /  TBili  0.6  /  DBili  x   /  AST  35  /  ALT  35  /  AlkPhos  40  04-07

## 2021-04-07 NOTE — ED PROVIDER NOTE - CLINICAL SUMMARY MEDICAL DECISION MAKING FREE TEXT BOX
79 y/o female with worsening COVID sx, now with A-fib, weakness, likely secondary to monoclonal, obtain labs, fluids, O2 supplement, Cardizem, admission.

## 2021-04-08 ENCOUNTER — TRANSCRIPTION ENCOUNTER (OUTPATIENT)
Age: 81
End: 2021-04-08

## 2021-04-08 LAB
A1C WITH ESTIMATED AVERAGE GLUCOSE RESULT: 5.7 % — HIGH (ref 4–5.6)
ADD ON TEST-SPECIMEN IN LAB: SIGNIFICANT CHANGE UP
ADD ON TEST-SPECIMEN IN LAB: SIGNIFICANT CHANGE UP
COVID-19 SPIKE DOMAIN AB INTERP: POSITIVE
COVID-19 SPIKE DOMAIN ANTIBODY RESULT: 133 U/ML — HIGH
ESTIMATED AVERAGE GLUCOSE: 117 MG/DL — HIGH (ref 68–114)
SARS-COV-2 IGG+IGM SERPL QL IA: 133 U/ML — HIGH
SARS-COV-2 IGG+IGM SERPL QL IA: POSITIVE

## 2021-04-08 PROCEDURE — 93306 TTE W/DOPPLER COMPLETE: CPT | Mod: 26

## 2021-04-08 PROCEDURE — 99232 SBSQ HOSP IP/OBS MODERATE 35: CPT | Mod: CS

## 2021-04-08 RX ORDER — DIPHENHYDRAMINE HCL 50 MG
25 CAPSULE ORAL AT BEDTIME
Refills: 0 | Status: DISCONTINUED | OUTPATIENT
Start: 2021-04-08 | End: 2021-04-09

## 2021-04-08 RX ORDER — APIXABAN 2.5 MG/1
1 TABLET, FILM COATED ORAL
Qty: 60 | Refills: 0
Start: 2021-04-08 | End: 2021-05-07

## 2021-04-08 RX ADMIN — Medication 120 MILLIGRAM(S): at 17:49

## 2021-04-08 RX ADMIN — APIXABAN 5 MILLIGRAM(S): 2.5 TABLET, FILM COATED ORAL at 06:09

## 2021-04-08 RX ADMIN — Medication 100 MICROGRAM(S): at 06:09

## 2021-04-08 RX ADMIN — APIXABAN 5 MILLIGRAM(S): 2.5 TABLET, FILM COATED ORAL at 17:49

## 2021-04-08 RX ADMIN — Medication 25 MILLIGRAM(S): at 21:21

## 2021-04-08 NOTE — PHARMACOTHERAPY INTERVENTION NOTE - COMMENTS
as per [facility] pharmacy co-pay for one month supply $125 - patient is aware and agreeable;  is already on Eliquis
recommended obtaining patient's weight for new start Eliquis

## 2021-04-08 NOTE — CONSULT NOTE ADULT - SUBJECTIVE AND OBJECTIVE BOX
Patient is a 80y old  Female who presents with a chief complaint of weakness.       HPI:  81 y/o female with a PMHx of Cardiac arrhythmia  H/O: bigeminy and trigeminy, Thyroid CA s/p thyroidectomy, SVT, Hypothyroid, Pancreatitis, OA, s/p cholecystectomy, COVID-19+ on 03/30/21 s/p monoclonal antibody infusion on 03/01/21 presenting with weakness and SOB worsening the past few days. Denies fever, vomiting, diarrhea.  She was weak and found in AF pt almost LOC; adm for AF RVR dehydration.     Pt seen and examined by me this am.  Pt denies any symptoms.   No CP, SOB better.       PAST MEDICAL & SURGICAL HISTORY:  Hypothyroidism, acquired  s/p thyroidectomy    SVT (supraventricular tachycardia)  H/O    Cardiac arrhythmia  H/O: bigemeny and trigemeny    Osteoporosis    Knee pain, right    Fracture of right lower extremity  condyle and tibial plateau fracture    Hearing loss of right ear, unspecified hearing loss type    Thyroid cancer  s/p thyroidectomy    Pancreatitis  2015    Prolapse of vaginal wall    S/P thyroidectomy  12/2005    S/P parathyroidectomy  12/2005    S/P cholecystectomy  2015    S/P arthroscopy of left knee  2016    S/P colonoscopy  unsure of all dates; last one 2016        MEDICATIONS  (STANDING):  apixaban 5 milliGRAM(s) Oral two times a day  levothyroxine 100 MICROGram(s) Oral daily  verapamil  milliGRAM(s) Oral daily    MEDICATIONS  (PRN):  aluminum hydroxide/magnesium hydroxide/simethicone Suspension 30 milliLiter(s) Oral every 4 hours PRN Dyspepsia  ondansetron Injectable 4 milliGRAM(s) IV Push every 6 hours PRN Nausea      FAMILY HISTORY:  Family history non-contributory        SOCIAL HISTORY: no recent smoking     REVIEW OF SYSTEMS:  CONSTITUTIONAL:    No fatigue, malaise, lethargy.  No fever or chills.  RESPIRATORY:  No cough.  No wheeze.  No hemoptysis.  c/o shortness of breath.  CARDIOVASCULAR:  No chest pains.  No palpitations. c/o shortness of breath, No orthopnea or PND.  GASTROINTESTINAL:  No abdominal pain.  No nausea or vomiting.    GENITOURINARY:    No hematuria.    MUSCULOSKELETAL:  No musculoskeletal pain.  No joint swelling.  No arthritis.  NEUROLOGICAL:  No tingling or numbness or weakness.  PSYCHIATRIC:  No confusion  SKIN:  No rashes.            Vital Signs Last 24 Hrs  T(C): 36.3 (08 Apr 2021 09:10), Max: 36.7 (07 Apr 2021 14:54)  T(F): 97.3 (08 Apr 2021 09:10), Max: 98 (07 Apr 2021 14:54)  HR: 55 (08 Apr 2021 09:10) (55 - 102)  BP: 113/79 (08 Apr 2021 09:10) (100/67 - 117/50)  BP(mean): 75 (07 Apr 2021 10:39) (75 - 75)  RR: 19 (08 Apr 2021 09:10) (19 - 24)  SpO2: 97% (08 Apr 2021 09:10) (97% - 100%)    PHYSICAL EXAM-    Constitutional: elderly female in no acute distress     Head: Head is normocephalic and atraumatic.      Neck: No JVD.     Cardiovascular: irregular rate and rhythm without S3, S4. No murmurs or rubs are appreciated.      Respiratory: Breath sounds are normal. No rales. No wheezing.    Abdomen: Soft, nontender, nondistended with positive bowel sounds.      Extremity: No tenderness. No  pitting edema     Neurologic: The patient is alert and oriented.      Skin: No rash, no obvious lesions noted.      Psychiatric: The patient appears to be emotionally stable.      INTERPRETATION OF TELEMETRY: SR     ECG: Atrial fibrillation.     I&O's Detail      LABS:                        15.2   5.22  )-----------( 226      ( 07 Apr 2021 10:04 )             45.6     04-07    138  |  110<H>  |  18  ----------------------------<  171<H>  4.7   |  22  |  1.27    Ca    8.7      07 Apr 2021 10:04    TPro  7.1  /  Alb  3.6  /  TBili  0.6  /  DBili  x   /  AST  35  /  ALT  35  /  AlkPhos  40  04-07    CARDIAC MARKERS ( 07 Apr 2021 10:04 )  <0.015 ng/mL / x     / x     / x     / x              I&O's Summary    BNP  RADIOLOGY & ADDITIONAL STUDIES:  < from: Xray Chest 1 View- PORTABLE-Urgent (04.07.21 @ 10:49) >    EXAM:  XR CHEST PORTABLE URGENT 1V                            PROCEDURE DATE:  04/07/2021          INTERPRETATION:  AP erect chest on April 7, 2021 at 10:42 AM. Patient is short of breath with cough and fever.    Heart may be enlarged. The lung fields and pleural surfaces are unremarkable.    Chest is similar to March 30 of this year.    IMPRESSION: No acute finding or change.            RUTHIE SEGOVIA MD; Attending Radiologist  This document has been electronically signed. Apr 7 2021 11:34AM    < end of copied text >

## 2021-04-08 NOTE — PROGRESS NOTE ADULT - SUBJECTIVE AND OBJECTIVE BOX
CHIEF COMPLAINT/DIAGNOSIS: afib rvr    HPI: 79 y/o female with a PMHx of Cardiac arrhythmia  H/O: bigeminy and trigeminy, Thyroid CA s/p thyroidectomy, SVT, Hypothyroid, Pancreatitis, OA, s/p cholecystectomy, COVID-19+ on 03/30/21 s/p monoclonal antibody infusion on 03/01/21 presenting with weakness and SOB worsening the past few days.  is currently inpatient at [facility] for COVID related sx. Denies fever, vomiting, diarrhea. She was weak and found in AF pt almost LOC; adm for AF RVR dehydration     4/8:  REVIEW OF SYSTEMS:  All other review of systems is negative unless indicated above    PHYSICAL EXAM:  Constitutional: NAD, awake and alert, well-developed  HEENT: PERR, EOMI, Normal Hearing, MMM  Neck: Soft and supple, No LAD, No JVD  Respiratory: Breath sounds are clear bilaterally, No wheezing, rales or rhonchi  Cardiovascular: S1 and S2, regular rate and rhythm, no Murmurs, gallops or rubs  Gastrointestinal: Bowel Sounds present, soft, nontender, nondistended, no guarding, no rebound  Extremities: No peripheral edema  Vascular: 2+ peripheral pulses  Neurological: A/O x 3, no focal deficits  Musculoskeletal: 5/5 strength b/l upper and lower extremities  Skin: No rashes    Vital Signs Last 24 Hrs  T(C): 36.5 (07 Apr 2021 18:39), Max: 37.1 (07 Apr 2021 10:14)  T(F): 97.7 (07 Apr 2021 18:39), Max: 98.7 (07 Apr 2021 10:14)  HR: 57 (08 Apr 2021 06:19) (57 - 115)  BP: 108/52 (08 Apr 2021 06:19) (100/67 - 118/59)  BP(mean): 75 (07 Apr 2021 10:39) (75 - 82)  RR: 20 (07 Apr 2021 20:09) (20 - 24)  SpO2: 97% (07 Apr 2021 20:09) (97% - 100%)    LABS: All Labs Reviewed:                        15.2   5.22  )-----------( 226      ( 07 Apr 2021 10:04 )             45.6     04-07    138  |  110<H>  |  18  ----------------------------<  171<H>  4.7   |  22  |  1.27    Ca    8.7      07 Apr 2021 10:04    TPro  7.1  /  Alb  3.6  /  TBili  0.6  /  DBili  x   /  AST  35  /  ALT  35  /  AlkPhos  40  04-07      CARDIAC MARKERS ( 07 Apr 2021 10:04 )  <0.015 ng/mL / x     / x     / x     / x        RADIOLOGY:  < from: Xray Chest 1 View- PORTABLE-Urgent (04.07.21 @ 10:49) >  IMPRESSION: No acute finding or change.  < end of copied text >    ECHOCARDIOGRAM: pending     MEDICATIONS  (STANDING):  apixaban 5 milliGRAM(s) Oral two times a day  levothyroxine 100 MICROGram(s) Oral daily  metoprolol tartrate 25 milliGRAM(s) Oral every 8 hours  verapamil  milliGRAM(s) Oral daily    MEDICATIONS  (PRN):  aluminum hydroxide/magnesium hydroxide/simethicone Suspension 30 milliLiter(s) Oral every 4 hours PRN Dyspepsia  ondansetron Injectable 4 milliGRAM(s) IV Push every 6 hours PRN Nausea    TELEMETRY REVIEW:  4/8: sinus - sinus angela    ASSESSMENT AND PLAN:    PAF w/ RVR  - had AF few years ago. tele monitoring. tele review as above.   - d/c PO BB  - Cont. Verapamil at home- continue  - CHADsVasc = 6. Cont. Eliquis for stroke ppx  - check TSH       * Known COVID 1 week ago, vaccinated, not hypoxic, no treatment    case d/w daughter RN: [phone]; no code status CHIEF COMPLAINT/DIAGNOSIS: afib rvr    HPI: 79 y/o female with a PMHx of Cardiac arrhythmia  H/O: bigeminy and trigeminy, Thyroid CA s/p thyroidectomy, SVT, Hypothyroid, Pancreatitis, OA, s/p cholecystectomy, COVID-19+ on 03/30/21 s/p monoclonal antibody infusion on 03/01/21 presenting with weakness and SOB worsening the past few days.  is currently inpatient at [facility] for COVID related sx. Denies fever, vomiting, diarrhea. She was weak and found in AF pt almost LOC; adm for AF RVR dehydration     4/8:  REVIEW OF SYSTEMS:  All other review of systems is negative unless indicated above    PHYSICAL EXAM:  Constitutional: NAD, awake and alert, well-developed  HEENT: PERR, EOMI, Normal Hearing, MMM  Neck: Soft and supple, No LAD, No JVD  Respiratory: Breath sounds are clear bilaterally, No wheezing, rales or rhonchi  Cardiovascular: S1 and S2, regular rate and rhythm, no Murmurs, gallops or rubs  Gastrointestinal: Bowel Sounds present, soft, nontender, nondistended, no guarding, no rebound  Extremities: No peripheral edema  Vascular: 2+ peripheral pulses  Neurological: A/O x 3, no focal deficits  Musculoskeletal: 5/5 strength b/l upper and lower extremities  Skin: No rashes    Vital Signs Last 24 Hrs  T(C): 36.5 (07 Apr 2021 18:39), Max: 37.1 (07 Apr 2021 10:14)  T(F): 97.7 (07 Apr 2021 18:39), Max: 98.7 (07 Apr 2021 10:14)  HR: 57 (08 Apr 2021 06:19) (57 - 115)  BP: 108/52 (08 Apr 2021 06:19) (100/67 - 118/59)  BP(mean): 75 (07 Apr 2021 10:39) (75 - 82)  RR: 20 (07 Apr 2021 20:09) (20 - 24)  SpO2: 97% (07 Apr 2021 20:09) (97% - 100%)    LABS: All Labs Reviewed:                        15.2   5.22  )-----------( 226      ( 07 Apr 2021 10:04 )             45.6     04-07    138  |  110<H>  |  18  ----------------------------<  171<H>  4.7   |  22  |  1.27    Ca    8.7      07 Apr 2021 10:04    TPro  7.1  /  Alb  3.6  /  TBili  0.6  /  DBili  x   /  AST  35  /  ALT  35  /  AlkPhos  40  04-07      CARDIAC MARKERS ( 07 Apr 2021 10:04 )  <0.015 ng/mL / x     / x     / x     / x        RADIOLOGY:  < from: Xray Chest 1 View- PORTABLE-Urgent (04.07.21 @ 10:49) >  IMPRESSION: No acute finding or change.  < end of copied text >    ECHOCARDIOGRAM: pending     MEDICATIONS  (STANDING):  apixaban 5 milliGRAM(s) Oral two times a day  levothyroxine 100 MICROGram(s) Oral daily  metoprolol tartrate 25 milliGRAM(s) Oral every 8 hours  verapamil  milliGRAM(s) Oral daily    MEDICATIONS  (PRN):  aluminum hydroxide/magnesium hydroxide/simethicone Suspension 30 milliLiter(s) Oral every 4 hours PRN Dyspepsia  ondansetron Injectable 4 milliGRAM(s) IV Push every 6 hours PRN Nausea    TELEMETRY REVIEW:  4/8: sinus - sinus angela    ASSESSMENT AND PLAN:    1. PAF w/ RVR  - had AF few years ago. tele monitoring. tele review as above. converted to NSR at 2AM   - d/c PO BB  - Cont. Verapamil at home- continue  - CHADsVasc = 3. check a1c. Cont. Eliquis for stroke ppx  - check TSH  - Cardio eval appreciated     COVID19  - Known COVID 1 week ago, vaccinated, not hypoxic, no treatment    DVT ppx - PO Eliquis     ~~ Case d/w daughter RN: [phone]; no code status CHIEF COMPLAINT/DIAGNOSIS: afib rvr    HPI: 79 y/o female with a PMHx of Cardiac arrhythmia  H/O: bigeminy and trigeminy, Thyroid CA s/p thyroidectomy, SVT, Hypothyroid, Pancreatitis, OA, s/p cholecystectomy, COVID-19+ on 03/30/21 s/p monoclonal antibody infusion on 03/01/21 presenting with weakness and SOB worsening the past few days.  is currently inpatient at [facility] for COVID related sx. Denies fever, vomiting, diarrhea. She was weak and found in AF pt almost LOC; adm for AF RVR dehydration     4/8:  REVIEW OF SYSTEMS:  All other review of systems is negative unless indicated above    PHYSICAL EXAM:  Constitutional: NAD, awake and alert, well-developed  HEENT: PERR, EOMI, Normal Hearing, MMM  Neck: Soft and supple, No LAD, No JVD  Respiratory: Breath sounds are clear bilaterally, No wheezing, rales or rhonchi  Cardiovascular: S1 and S2, regular rate and rhythm, no Murmurs, gallops or rubs  Gastrointestinal: Bowel Sounds present, soft, nontender, nondistended, no guarding, no rebound  Extremities: No peripheral edema  Vascular: 2+ peripheral pulses  Neurological: A/O x 3, no focal deficits  Musculoskeletal: 5/5 strength b/l upper and lower extremities  Skin: No rashes    Vital Signs Last 24 Hrs  T(C): 36.5 (07 Apr 2021 18:39), Max: 37.1 (07 Apr 2021 10:14)  T(F): 97.7 (07 Apr 2021 18:39), Max: 98.7 (07 Apr 2021 10:14)  HR: 57 (08 Apr 2021 06:19) (57 - 115)  BP: 108/52 (08 Apr 2021 06:19) (100/67 - 118/59)  BP(mean): 75 (07 Apr 2021 10:39) (75 - 82)  RR: 20 (07 Apr 2021 20:09) (20 - 24)  SpO2: 97% (07 Apr 2021 20:09) (97% - 100%)    LABS: All Labs Reviewed:                        15.2   5.22  )-----------( 226      ( 07 Apr 2021 10:04 )             45.6     04-07    138  |  110<H>  |  18  ----------------------------<  171<H>  4.7   |  22  |  1.27    Ca    8.7      07 Apr 2021 10:04    TPro  7.1  /  Alb  3.6  /  TBili  0.6  /  DBili  x   /  AST  35  /  ALT  35  /  AlkPhos  40  04-07      CARDIAC MARKERS ( 07 Apr 2021 10:04 )  <0.015 ng/mL / x     / x     / x     / x        RADIOLOGY:  < from: Xray Chest 1 View- PORTABLE-Urgent (04.07.21 @ 10:49) >  IMPRESSION: No acute finding or change.  < end of copied text >    ECHOCARDIOGRAM: pending     MEDICATIONS  (STANDING):  apixaban 5 milliGRAM(s) Oral two times a day  levothyroxine 100 MICROGram(s) Oral daily  metoprolol tartrate 25 milliGRAM(s) Oral every 8 hours  verapamil  milliGRAM(s) Oral daily    MEDICATIONS  (PRN):  aluminum hydroxide/magnesium hydroxide/simethicone Suspension 30 milliLiter(s) Oral every 4 hours PRN Dyspepsia  ondansetron Injectable 4 milliGRAM(s) IV Push every 6 hours PRN Nausea    TELEMETRY REVIEW:  4/8: sinus - sinus angela    ASSESSMENT AND PLAN:    1. SOB likely due to PAF w/ RVR  - had AF few years ago. tele monitoring. tele review as above. converted to NSR at 2AM   - ddimer slightly elevated. age adjusted low risk for PE. not hypoxic. defer further testing for now.  - d/c PO BB  - Cont. Verapamil at home- continue  - CHADsVasc = 3. check a1c. Cont. Eliquis for stroke ppx  - check TSH  - Cardio eval appreciated     COVID19  - Known COVID 1 week ago, vaccinated, not hypoxic, no treatment  - maintain isolation.    DVT ppx - PO Eliquis     ~~ Case d/w daughter RN: [phone]; no code status CHIEF COMPLAINT/DIAGNOSIS: afib rvr    HPI: 79 y/o female with a PMHx of Cardiac arrhythmia  H/O: bigeminy and trigeminy, Thyroid CA s/p thyroidectomy, SVT, Hypothyroid, Pancreatitis, OA, s/p cholecystectomy, COVID-19+ on 03/30/21 s/p monoclonal antibody infusion on 03/01/21 presenting with weakness and SOB worsening the past few days.  is currently inpatient at [facility] for COVID related sx. Denies fever, vomiting, diarrhea. She was weak and found in AF pt almost LOC; adm for AF RVR dehydration     4/8:  REVIEW OF SYSTEMS:  All other review of systems is negative unless indicated above    PHYSICAL EXAM:  Constitutional: NAD, awake and alert, well-developed  HEENT: PERR, EOMI, Normal Hearing, MMM  Neck: Soft and supple, No LAD, No JVD  Respiratory: Breath sounds are clear bilaterally, No wheezing, rales or rhonchi  Cardiovascular: S1 and S2, regular rate and rhythm, no Murmurs, gallops or rubs  Gastrointestinal: Bowel Sounds present, soft, nontender, nondistended, no guarding, no rebound  Extremities: No peripheral edema  Vascular: 2+ peripheral pulses  Neurological: A/O x 3, no focal deficits  Musculoskeletal: 5/5 strength b/l upper and lower extremities  Skin: No rashes    Vital Signs Last 24 Hrs  T(C): 36.5 (07 Apr 2021 18:39), Max: 37.1 (07 Apr 2021 10:14)  T(F): 97.7 (07 Apr 2021 18:39), Max: 98.7 (07 Apr 2021 10:14)  HR: 57 (08 Apr 2021 06:19) (57 - 115)  BP: 108/52 (08 Apr 2021 06:19) (100/67 - 118/59)  BP(mean): 75 (07 Apr 2021 10:39) (75 - 82)  RR: 20 (07 Apr 2021 20:09) (20 - 24)  SpO2: 97% (07 Apr 2021 20:09) (97% - 100%)    LABS: All Labs Reviewed:                        15.2   5.22  )-----------( 226      ( 07 Apr 2021 10:04 )             45.6     04-07    138  |  110<H>  |  18  ----------------------------<  171<H>  4.7   |  22  |  1.27    Ca    8.7      07 Apr 2021 10:04    TPro  7.1  /  Alb  3.6  /  TBili  0.6  /  DBili  x   /  AST  35  /  ALT  35  /  AlkPhos  40  04-07      CARDIAC MARKERS ( 07 Apr 2021 10:04 )  <0.015 ng/mL / x     / x     / x     / x        RADIOLOGY:  < from: Xray Chest 1 View- PORTABLE-Urgent (04.07.21 @ 10:49) >  IMPRESSION: No acute finding or change.  < end of copied text >    ECHOCARDIOGRAM: pending     MEDICATIONS  (STANDING):  apixaban 5 milliGRAM(s) Oral two times a day  levothyroxine 100 MICROGram(s) Oral daily  metoprolol tartrate 25 milliGRAM(s) Oral every 8 hours  verapamil  milliGRAM(s) Oral daily    MEDICATIONS  (PRN):  aluminum hydroxide/magnesium hydroxide/simethicone Suspension 30 milliLiter(s) Oral every 4 hours PRN Dyspepsia  ondansetron Injectable 4 milliGRAM(s) IV Push every 6 hours PRN Nausea    TELEMETRY REVIEW:  4/8: sinus - sinus angela    ASSESSMENT AND PLAN:    1. SOB likely due to PAF w/ RVR  - had AF few years ago. tele monitoring. tele review as above. converted to NSR at 2AM   - ddimer slightly elevated. age adjusted low risk for PE. not hypoxic. defer further testing for now.  - d/c PO BB  - Cont. Verapamil at home- continue  - CHADsVasc = 3. check a1c. Cont. Eliquis for stroke ppx  - check TSH  - Cardio eval appreciated     COVID19  - Known COVID 1 week ago, vaccinated, not hypoxic, no treatment  - maintain isolation.    CKD Stage 3 - stable    DVT ppx - PO Eliquis     ~~ Case d/w daughter RN: [phone]; no code status CHIEF COMPLAINT/DIAGNOSIS: afib rvr    HPI: 79 y/o female with a PMHx of Cardiac arrhythmia  H/O: bigeminy and trigeminy, Thyroid CA s/p thyroidectomy, SVT, Hypothyroid, Pancreatitis, OA, s/p cholecystectomy, COVID-19+ on 03/30/21 s/p monoclonal antibody infusion on 03/01/21 presenting with weakness and SOB worsening the past few days.  is currently inpatient at [facility] for COVID related sx. Denies fever, vomiting, diarrhea. She was weak and found in AF pt almost LOC; adm for AF RVR dehydration     4/8: feeling tired, did not sleep well last night. asymtpomatic w/ ambulation - no sob, cp or palp. occasional productive cough.  REVIEW OF SYSTEMS:  All other review of systems is negative unless indicated above    PHYSICAL EXAM:  Constitutional: Awake and alert, well-developed  HEENT:  Normal Hearing, MMM  Neck: Soft and supple   Respiratory: Breath sounds are clear bilaterally   Cardiovascular: S1 and S2, RRR  Gastrointestinal: Bowel Sounds present, soft, nontender, nondistended, no guarding, no rebound  Extremities: No peripheral edema  Vascular: 2+ peripheral pulses  Neurological: A/O x 3, no focal deficits  Musculoskeletal: 5/5 strength b/l upper and lower extremities  Skin: No rashes    Vital Signs Last 24 Hrs  T(C): 36.5 (07 Apr 2021 18:39), Max: 37.1 (07 Apr 2021 10:14)  T(F): 97.7 (07 Apr 2021 18:39), Max: 98.7 (07 Apr 2021 10:14)  HR: 57 (08 Apr 2021 06:19) (57 - 115)  BP: 108/52 (08 Apr 2021 06:19) (100/67 - 118/59)  BP(mean): 75 (07 Apr 2021 10:39) (75 - 82)  RR: 20 (07 Apr 2021 20:09) (20 - 24)  SpO2: 97% (07 Apr 2021 20:09) (97% - 100%) -- room air     LABS: All Labs Reviewed:                        15.2   5.22  )-----------( 226      ( 07 Apr 2021 10:04 )             45.6     04-07    138  |  110<H>  |  18  ----------------------------<  171<H>  4.7   |  22  |  1.27    Ca    8.7      07 Apr 2021 10:04    TPro  7.1  /  Alb  3.6  /  TBili  0.6  /  DBili  x   /  AST  35  /  ALT  35  /  AlkPhos  40  04-07      CARDIAC MARKERS ( 07 Apr 2021 10:04 )  <0.015 ng/mL / x     / x     / x     / x        RADIOLOGY:  < from: Xray Chest 1 View- PORTABLE-Urgent (04.07.21 @ 10:49) >  IMPRESSION: No acute finding or change.  < end of copied text >    ECHOCARDIOGRAM: pending     MEDICATIONS  (STANDING):  apixaban 5 milliGRAM(s) Oral two times a day  levothyroxine 100 MICROGram(s) Oral daily  metoprolol tartrate 25 milliGRAM(s) Oral every 8 hours  verapamil  milliGRAM(s) Oral daily    MEDICATIONS  (PRN):  aluminum hydroxide/magnesium hydroxide/simethicone Suspension 30 milliLiter(s) Oral every 4 hours PRN Dyspepsia  ondansetron Injectable 4 milliGRAM(s) IV Push every 6 hours PRN Nausea    TELEMETRY REVIEW:  4/8: sinus - sinus angela    ASSESSMENT AND PLAN:    1. SOB likely due to PAF w/ RVR  - had AF few years ago. tele monitoring. tele review as above. converted to NSR at 2AM   - d dimer slightly elevated. age adjusted low risk for PE. not hypoxic. defer further testing for now.  - d/c PO BB  - Cont. Verapamil at home- continue  - CHADsVasc = 3. check a1c. Cont. Eliquis for stroke ppx  - check TSH low. like ESS -- repeat outpatient w/ endocrinologist   - f/u echo  - Cardio eval appreciated     COVID19  - Known COVID 1 week ago, vaccinated, not hypoxic, no treatment  - maintain isolation.    CKD Stage 3 - stable    DVT ppx - PO Eliquis    Full Code     ~~ LM for daughter to call back for updates [phone]

## 2021-04-08 NOTE — DISCHARGE NOTE PROVIDER - HOSPITAL COURSE
CHIEF COMPLAINT/DIAGNOSIS: afib rvr    HPI: 79 y/o female with a PMHx of Cardiac arrhythmia  H/O: bigeminy and trigeminy, Thyroid CA s/p thyroidectomy, SVT, Hypothyroid, Pancreatitis, OA, s/p cholecystectomy, COVID-19+ on 03/30/21 s/p monoclonal antibody infusion on 03/01/21 presenting with weakness and SOB worsening the past few days.  is currently inpatient at [facility] for COVID related sx. Denies fever, vomiting, diarrhea. She was weak and found in AF pt almost LOC; adm for AF RVR dehydration     4/9:  REVIEW OF SYSTEMS:  All other review of systems is negative unless indicated above    PHYSICAL EXAM:  Constitutional: Awake and alert, well-developed  HEENT:  Normal Hearing, MMM  Neck: Soft and supple   Respiratory: Breath sounds are clear bilaterally   Cardiovascular: S1 and S2, RRR  Gastrointestinal: Bowel Sounds present, soft, nontender, nondistended, no guarding, no rebound  Extremities: No peripheral edema  Vascular: 2+ peripheral pulses  Neurological: A/O x 3, no focal deficits  Musculoskeletal: 5/5 strength b/l upper and lower extremities  Skin: No rashes    Vitals:        LABS, RADIOLOGY, ECHO, MEDS, TELE - ALL REVIEWED     ASSESSMENT AND PLAN:    1. SOB likely due to PAF w/ RVR  - had AF few years ago. tele monitoring. tele review as above. converted to NSR at 2AM   - d dimer slightly elevated. age adjusted low risk for PE. not hypoxic. defer further testing for now.  - d/c PO BB  - Cont. Verapamil at home- continue  - CHADsVasc = 3. check a1c. Cont. Eliquis for stroke ppx  - check TSH low. like ESS -- repeat outpatient w/ endocrinologist   - f/u echo  - Cardio eval appreciated     COVID19  - Known COVID 1 week ago, vaccinated, not hypoxic, no treatment  - maintain isolation.    CKD Stage 3 - stable    DVT ppx - PO Eliquis    Full Code     ~~ LM for daughter to call back for updates [phone]     Dispo: discharge to HOME in stable condition    Final diagnosis, treatment plan, and follow-up recommendations were discussed and explained to the patient. The patient was given an opportunity to ask questions concerning the diagnosis and treatment plan. The patient acknowledged understanding of the diagnosis, treatment, and follow-up recommendations. The patient was advised to seek urgent care upon discharge if worsening symptoms develop prior to scheduled follow-up. Time spent on discharge included time with the patient, and also coordinating discharge care as outlined below.    Total time spent: 50 min   CHIEF COMPLAINT/DIAGNOSIS: afib rvr    HPI: 79 y/o female with a PMHx of Cardiac arrhythmia  H/O: bigeminy and trigeminy, Thyroid CA s/p thyroidectomy, SVT, Hypothyroid, Pancreatitis, OA, s/p cholecystectomy, COVID-19+ on 03/30/21 s/p monoclonal antibody infusion on 03/01/21 presenting with weakness and SOB worsening the past few days.  is currently inpatient at [facility] for COVID related sx. Denies fever, vomiting, diarrhea. She was weak and found in AF pt almost LOC; adm for AF RVR dehydration     4/9: no sob, cp or palp. occasional productive cough.    REVIEW OF SYSTEMS:  All other review of systems is negative unless indicated above    PHYSICAL EXAM:  Constitutional: Awake and alert, well-developed  HEENT:  Normal Hearing, MMM  Neck: Soft and supple   Respiratory: Breath sounds are clear bilaterally   Cardiovascular: S1 and S2, RRR  Gastrointestinal: Bowel Sounds present, soft, nontender, nondistended, no guarding, no rebound  Extremities: No peripheral edema  Vascular: 2+ peripheral pulses  Neurological: A/O x 3, no focal deficits  Musculoskeletal: 5/5 strength b/l upper and lower extremities  Skin: No rashes    Vital Signs Last 24 Hrs  T(C): 36.6 (09 Apr 2021 08:22), Max: 36.6 (09 Apr 2021 08:22)  T(F): 97.8 (09 Apr 2021 08:22), Max: 97.8 (09 Apr 2021 08:22)  HR: 68 (09 Apr 2021 08:22) (55 - 68)  BP: 118/68 (09 Apr 2021 08:22) (113/79 - 118/68)  BP(mean): --  RR: 18 (09 Apr 2021 08:22) (18 - 19)  SpO2: 97% (09 Apr 2021 08:22) (96% - 97%)    LABS, RADIOLOGY, ECHO, MEDS, TELE - ALL REVIEWED     ASSESSMENT AND PLAN:    1. SOB likely due to PAF w/ RVR  - had AF few years ago. tele monitoring. tele review as above. converted to NSR at 2AM   - d dimer slightly elevated. age adjusted low risk for PE. not hypoxic. defer further testing for now.  - d/c PO BB  - Cont. Verapamil at home- continue  - CHADsVasc = 3. check a1c. Cont. Eliquis for stroke ppx  - check TSH low. like ESS -- repeat outpatient w/ endocrinologist   - f/u echo  - Cardio annabelal appreciated     COVID19  - Known COVID 1 week ago, vaccinated, not hypoxic, no treatment  - maintain isolation.    CKD Stage 3 - stable    DVT ppx - PO Eliquis    Full Code     ~~ LM for daughter to call back for updates [phone] . f/u cardio in 1 week.    Dispo: discharge to HOME in stable condition    Final diagnosis, treatment plan, and follow-up recommendations were discussed and explained to the patient. The patient was given an opportunity to ask questions concerning the diagnosis and treatment plan. The patient acknowledged understanding of the diagnosis, treatment, and follow-up recommendations. The patient was advised to seek urgent care upon discharge if worsening symptoms develop prior to scheduled follow-up. Time spent on discharge included time with the patient, and also coordinating discharge care as outlined below.    Total time spent: 50 min   CHIEF COMPLAINT/DIAGNOSIS: afib rvr    HPI: 79 y/o female with a PMHx of Cardiac arrhythmia  H/O: bigeminy and trigeminy, Thyroid CA s/p thyroidectomy, SVT, Hypothyroid, Pancreatitis, OA, s/p cholecystectomy, COVID-19+ on 03/30/21 s/p monoclonal antibody infusion on 03/01/21 presenting with weakness and SOB worsening the past few days.  is currently inpatient at [facility] for COVID related sx. Denies fever, vomiting, diarrhea. She was weak and found in AF pt almost LOC; adm for AF RVR dehydration     4/9: no sob, cp or palp. occasional productive cough.    REVIEW OF SYSTEMS:  All other review of systems is negative unless indicated above    PHYSICAL EXAM:  Constitutional: Awake and alert, well-developed  HEENT:  Normal Hearing, MMM  Neck: Soft and supple   Respiratory: Breath sounds are clear bilaterally   Cardiovascular: S1 and S2, RRR  Gastrointestinal: Bowel Sounds present, soft, nontender, nondistended, no guarding, no rebound  Extremities: No peripheral edema  Vascular: 2+ peripheral pulses  Neurological: A/O x 3, no focal deficits  Musculoskeletal: 5/5 strength b/l upper and lower extremities  Skin: No rashes, no jaundice    Vital Signs Last 24 Hrs  T(C): 36.6 (09 Apr 2021 08:22), Max: 36.6 (09 Apr 2021 08:22)  T(F): 97.8 (09 Apr 2021 08:22), Max: 97.8 (09 Apr 2021 08:22)  HR: 68 (09 Apr 2021 08:22) (55 - 68)  BP: 118/68 (09 Apr 2021 08:22) (113/79 - 118/68)  BP(mean): --  RR: 18 (09 Apr 2021 08:22) (18 - 19)  SpO2: 97% (09 Apr 2021 08:22) (96% - 97%)    LABS, RADIOLOGY, ECHO, MEDS, TELE - ALL REVIEWED     ASSESSMENT AND PLAN:    1. SOB likely due to PAF w/ RVR  - had AF few years ago. tele monitoring. tele review as above. converted to NSR at 2AM   - d dimer slightly elevated. age adjusted low risk for PE. not hypoxic. defer further testing for now.  - d/c PO BB  - Cont. Verapamil at home- continue  - CHADsVasc = 3. check a1c. Cont. Eliquis for stroke ppx  - check TSH low. like ESS -- repeat outpatient w/ endocrinologist   - f/u echo  - Cardio annabelal appreciated     COVID19  - Known COVID 1 week ago, vaccinated, not hypoxic, no treatment  - maintain isolation.    CKD Stage 3 - stable    DVT ppx - PO Eliquis    Full Code     ~~ LM for daughter to call back for updates [phone] . f/u cardio in 1 week.    Dispo: discharge to HOME in stable condition    Final diagnosis, treatment plan, and follow-up recommendations were discussed and explained to the patient. The patient was given an opportunity to ask questions concerning the diagnosis and treatment plan. The patient acknowledged understanding of the diagnosis, treatment, and follow-up recommendations. The patient was advised to seek urgent care upon discharge if worsening symptoms develop prior to scheduled follow-up. Time spent on discharge included time with the patient, and also coordinating discharge care as outlined below.    Total time spent: 50 min

## 2021-04-08 NOTE — DISCHARGE NOTE PROVIDER - CARE PROVIDER_API CALL
Clif Ennis)  Cardiovascular Disease; Critical Care Medicine; Internal Medicine; Interventional Cardiology  [address] [place] [address]  Phone: [phone]  Fax: [phone]  Follow Up Time:     Zack Moise  [facility] MEDICINE  [address], [address]  [place] [address]  Phone: [phone]  Fax: [phone]  Follow Up Time:

## 2021-04-08 NOTE — DISCHARGE NOTE PROVIDER - NSDCMRMEDTOKEN_GEN_ALL_CORE_FT
apixaban 5 mg oral tablet: 1 tab(s) orally 2 times a day  Prolia 60 mg/mL subcutaneous solution: subcutaneous twice a year  Synthroid 100 mcg (0.1 mg) oral tablet: 1 tab(s) orally once a day  verapamil 120 mg/12 hours oral tablet, extended release: 1 tab(s) orally once a day

## 2021-04-08 NOTE — DISCHARGE NOTE PROVIDER - NSDCCAREPROVSEEN_GEN_ALL_CORE_FT
Freya Rain (Nurse Practitioner)  Mary William (Hospitalist)  Cammy Hanley (Cardiologist) Freya Rain (Nurse Practitioner)  Dr. Pearson (Hospitalist)  Dr. Ennis (Cardiologist)

## 2021-04-08 NOTE — DISCHARGE NOTE PROVIDER - NSDCCPCAREPLAN_GEN_ALL_CORE_FT
PRINCIPAL DISCHARGE DIAGNOSIS  Diagnosis: Atrial fibrillation  Assessment and Plan of Treatment: WHAT IS ATRIAL FIBRILLATION? Atrial fibrillation (AFIB) is a cardiac arrhythmia caused by a disorder in the hearts electrical system. An arrhythmia is a problem with the speed or rhythm of the heartbeat. Atrial fibrillation is the most common type of arrhythmia.  THINGS TO DO: (1) Monitor your blood pressure and heart rate (2) Limit or avoid alcohol intake – alcohol can increase your risk of AFIB (3) Do not smoke – nicotine can damage your heart and make it difficult to manage your AFIB (4) Eat heart healthy foods like fruits, vegetables, and whole grains (5) Manage a healthy weight (5) Get regular physical activity  MONITOR THESE SIGNS AND SYMPTOMS: (1) Shortness of breath (2) Nausea or vomiting (3) Chest pain or pressure (4) Chest palpitations. If you experience any of these, DO alert your primary care provider, or return to the Emergency Department if you feel very sick.  MEDICATIONS:  - Verapamil 120mg once a day (home medication, no change), Eliquis 5mg twice a day for stroke prevention (new medication, sent to pharmacy)  FOLLOW UP:  - Cardiologist (Dr. Ennis) in 1 week after discharge for further management.        SECONDARY DISCHARGE DIAGNOSES  Diagnosis: 2019 novel coronavirus disease (COVID-19)  Assessment and Plan of Treatment: WHAT IS CORONAVIRUS? Coronavirus (COVID19) is a disease which generally causes respiratory symptoms however, it can also affect other organs like the brain or heart. Blood vessels can also be affected leading to blood clots.  THINGS TO DO: (1) Limit the spread by avoiding close personal contact with an infected individual (2) Avoid large gatherings or crowds (3) Practice social distancing by maintaining at least 6 feet between you and others (4) Wear a mask (5) Prevent the spread of germs – wash your hands often and cover your mouth when you cough  MONITOR THESE SIGNS AND SYMPTOMS: (1) Shortness of breath/trouble breathing at rest (2) Chest pain or pressure (3) Confusion (4) Fever > 100.4. If you experience any of these, DO alert your primary care provider, or return to the Emergency Department if you feel very sick.

## 2021-04-08 NOTE — DISCHARGE NOTE PROVIDER - INSTRUCTIONS
Choose foods that are low in salt - examples include: fresh meats, poultry, fish, eggs, milk and yogurt. Avoid packaged/processed foods and excessive table salt use.

## 2021-04-09 ENCOUNTER — TRANSCRIPTION ENCOUNTER (OUTPATIENT)
Age: 81
End: 2021-04-09

## 2021-04-09 VITALS
RESPIRATION RATE: 18 BRPM | HEART RATE: 68 BPM | DIASTOLIC BLOOD PRESSURE: 68 MMHG | SYSTOLIC BLOOD PRESSURE: 118 MMHG | OXYGEN SATURATION: 97 % | TEMPERATURE: 98 F

## 2021-04-09 PROCEDURE — 99239 HOSP IP/OBS DSCHRG MGMT >30: CPT | Mod: CS

## 2021-04-09 RX ORDER — ACETAMINOPHEN 500 MG
650 TABLET ORAL EVERY 6 HOURS
Refills: 0 | Status: DISCONTINUED | OUTPATIENT
Start: 2021-04-09 | End: 2021-04-09

## 2021-04-09 RX ADMIN — Medication 650 MILLIGRAM(S): at 02:55

## 2021-04-09 RX ADMIN — Medication 650 MILLIGRAM(S): at 08:29

## 2021-04-09 RX ADMIN — APIXABAN 5 MILLIGRAM(S): 2.5 TABLET, FILM COATED ORAL at 06:21

## 2021-04-09 RX ADMIN — Medication 650 MILLIGRAM(S): at 01:54

## 2021-04-09 RX ADMIN — Medication 100 MICROGRAM(S): at 06:21

## 2021-04-09 NOTE — PROGRESS NOTE ADULT - SUBJECTIVE AND OBJECTIVE BOX
CHIEF COMPLAINT/DIAGNOSIS: afib rvr    HPI: 81 y/o female with a PMHx of Cardiac arrhythmia  H/O: bigeminy and trigeminy, Thyroid CA s/p thyroidectomy, SVT, Hypothyroid, Pancreatitis, OA, s/p cholecystectomy, COVID-19+ on 03/30/21 s/p monoclonal antibody infusion on 03/01/21 presenting with weakness and SOB worsening the past few days.  is currently inpatient at [facility] for COVID related sx. Denies fever, vomiting, diarrhea. She was weak and found in AF pt almost LOC; adm for AF RVR dehydration     4/8: feeling tired, did not sleep well last night. asymtpomatic w/ ambulation - no sob, cp or palp. occasional productive cough.  4/9:    REVIEW OF SYSTEMS:  All other review of systems is negative unless indicated above    PHYSICAL EXAM:  Constitutional: Awake and alert, well-developed  HEENT:  Normal Hearing, MMM  Neck: Soft and supple   Respiratory: Breath sounds are clear bilaterally   Cardiovascular: S1 and S2, RRR  Gastrointestinal: Bowel Sounds present, soft, nontender, nondistended, no guarding, no rebound  Extremities: No peripheral edema  Vascular: 2+ peripheral pulses  Neurological: A/O x 3, no focal deficits  Musculoskeletal: 5/5 strength b/l upper and lower extremities  Skin: No rashes    Vital Signs Last 24 Hrs  T(C): 36.6 (09 Apr 2021 08:22), Max: 36.6 (09 Apr 2021 08:22)  T(F): 97.8 (09 Apr 2021 08:22), Max: 97.8 (09 Apr 2021 08:22)  HR: 68 (09 Apr 2021 08:22) (55 - 68)  BP: 118/68 (09 Apr 2021 08:22) (113/79 - 118/68)  BP(mean): --  RR: 18 (09 Apr 2021 08:22) (18 - 19)  SpO2: 97% (09 Apr 2021 08:22) (96% - 97%)    LABS: All Labs Reviewed:                        15.2   5.22  )-----------( 226      ( 07 Apr 2021 10:04 )             45.6     04-07    138  |  110<H>  |  18  ----------------------------<  171<H>  4.7   |  22  |  1.27    Ca    8.7      07 Apr 2021 10:04    TPro  7.1  /  Alb  3.6  /  TBili  0.6  /  DBili  x   /  AST  35  /  ALT  35  /  AlkPhos  40  04-07      CARDIAC MARKERS ( 07 Apr 2021 10:04 )  <0.015 ng/mL / x     / x     / x     / x        RADIOLOGY:  < from: Xray Chest 1 View- PORTABLE-Urgent (04.07.21 @ 10:49) >  IMPRESSION: No acute finding or change.  < end of copied text >    ECHOCARDIOGRAM:   < from: TTE Echo Complete w/o Contrast w/ Doppler (04.08.21 @ 11:33) >   The mitral valve leaflets appear thin and normal.   Mild to Moderate mitral regurgitation is present.   The aortic valve appears mildly calcified. Valve opening seems to be   normal.   Mild (1+) aortic regurgitation is present.   The tricuspid valve leaflets are thin and pliable; valve motion is normal.   Mild (1+) tricuspid valve regurgitation is present.   The left ventricle is normal in size, wall thickness, wall motion and   contractility.   Estimated left ventricular ejection fraction is 70 %.   Normal appearing right ventricle structure and function.  < end of copied text >    MEDICATIONS  (STANDING):  apixaban 5 milliGRAM(s) Oral two times a day  levothyroxine 100 MICROGram(s) Oral daily  verapamil  milliGRAM(s) Oral daily    MEDICATIONS  (PRN):  acetaminophen   Tablet .. 650 milliGRAM(s) Oral every 6 hours PRN Temp greater or equal to 38C (100.4F), Mild Pain (1 - 3), Moderate Pain (4 - 6)  aluminum hydroxide/magnesium hydroxide/simethicone Suspension 30 milliLiter(s) Oral every 4 hours PRN Dyspepsia  diphenhydrAMINE 25 milliGRAM(s) Oral at bedtime PRN Insomnia  ondansetron Injectable 4 milliGRAM(s) IV Push every 6 hours PRN Nausea    TELEMETRY REVIEW:  4/8: sinus - sinus angela  4/9: sinus 50-60s    ASSESSMENT AND PLAN:    1. SOB likely due to PAF w/ RVR  - had AF few years ago. tele monitoring. tele review as above. converted to NSR at 2AM on 4/8  - d dimer slightly elevated. age adjusted low risk for PE. not hypoxic. defer further testing for now.  - d/c PO BB  - Cont. Verapamil at home- continue  - CHADsVasc = 3. check a1c > wnl. Cont. Eliquis for stroke ppx  - check TSH low. like ESS -- repeat outpatient w/ endocrinologist   - f/u echo as above. EF 70%  - Cardio eval appreciated     2. COVID19  - Known COVID 1 week ago, vaccinated, not hypoxic, no treatment  - maintain isolation.    3. CKD Stage 3 - stable    4. DVT ppx - PO Eliquis    Full Code     ~~ LM for daughter to call back for updates [phone]. d/c home. f/u cardio outpatient. CHIEF COMPLAINT/DIAGNOSIS: afib rvr    HPI: 79 y/o female with a PMHx of Cardiac arrhythmia  H/O: bigeminy and trigeminy, Thyroid CA s/p thyroidectomy, SVT, Hypothyroid, Pancreatitis, OA, s/p cholecystectomy, COVID-19+ on 03/30/21 s/p monoclonal antibody infusion on 03/01/21 presenting with weakness and SOB worsening the past few days.  is currently inpatient at [facility] for COVID related sx. Denies fever, vomiting, diarrhea. She was weak and found in AF pt almost LOC; adm for AF RVR dehydration     4/8: feeling tired, did not sleep well last night. asymtpomatic w/ ambulation - no sob, cp or palp. occasional productive cough.  4/9: no sob, cp or palp. occasional productive cough.    REVIEW OF SYSTEMS:  All other review of systems is negative unless indicated above    PHYSICAL EXAM:  Constitutional: Awake and alert, well-developed  HEENT:  Normal Hearing, MMM  Neck: Soft and supple   Respiratory: Breath sounds are clear bilaterally   Cardiovascular: S1 and S2, RRR  Gastrointestinal: Bowel Sounds present, soft, nontender, nondistended, no guarding, no rebound  Extremities: No peripheral edema  Vascular: 2+ peripheral pulses  Neurological: A/O x 3, no focal deficits  Musculoskeletal: 5/5 strength b/l upper and lower extremities  Skin: No rashes    Vital Signs Last 24 Hrs  T(C): 36.6 (09 Apr 2021 08:22), Max: 36.6 (09 Apr 2021 08:22)  T(F): 97.8 (09 Apr 2021 08:22), Max: 97.8 (09 Apr 2021 08:22)  HR: 68 (09 Apr 2021 08:22) (55 - 68)  BP: 118/68 (09 Apr 2021 08:22) (113/79 - 118/68)  BP(mean): --  RR: 18 (09 Apr 2021 08:22) (18 - 19)  SpO2: 97% (09 Apr 2021 08:22) (96% - 97%)    LABS: All Labs Reviewed:    RADIOLOGY:  < from: Xray Chest 1 View- PORTABLE-Urgent (04.07.21 @ 10:49) >  IMPRESSION: No acute finding or change.  < end of copied text >    ECHOCARDIOGRAM:   < from: TTE Echo Complete w/o Contrast w/ Doppler (04.08.21 @ 11:33) >   The mitral valve leaflets appear thin and normal.   Mild to Moderate mitral regurgitation is present.   The aortic valve appears mildly calcified. Valve opening seems to be   normal.   Mild (1+) aortic regurgitation is present.   The tricuspid valve leaflets are thin and pliable; valve motion is normal.   Mild (1+) tricuspid valve regurgitation is present.   The left ventricle is normal in size, wall thickness, wall motion and   contractility.   Estimated left ventricular ejection fraction is 70 %.   Normal appearing right ventricle structure and function.  < end of copied text >    MEDICATIONS  (STANDING):  apixaban 5 milliGRAM(s) Oral two times a day  levothyroxine 100 MICROGram(s) Oral daily  verapamil  milliGRAM(s) Oral daily    MEDICATIONS  (PRN):  acetaminophen   Tablet .. 650 milliGRAM(s) Oral every 6 hours PRN Temp greater or equal to 38C (100.4F), Mild Pain (1 - 3), Moderate Pain (4 - 6)  aluminum hydroxide/magnesium hydroxide/simethicone Suspension 30 milliLiter(s) Oral every 4 hours PRN Dyspepsia  diphenhydrAMINE 25 milliGRAM(s) Oral at bedtime PRN Insomnia  ondansetron Injectable 4 milliGRAM(s) IV Push every 6 hours PRN Nausea    TELEMETRY REVIEW:  4/8: sinus - sinus angela  4/9: sinus 50-60s    ASSESSMENT AND PLAN:    1. SOB likely due to PAF w/ RVR  - had AF few years ago. tele monitoring. tele review as above. converted to NSR at 2AM on 4/8  - d dimer slightly elevated. age adjusted low risk for PE. not hypoxic. defer further testing for now.  - d/c PO BB  - Cont. Verapamil at home- continue  - CHADsVasc = 3. check a1c > wnl. Cont. Eliquis for stroke ppx  - check TSH low. like ESS -- repeat outpatient w/ endocrinologist   - f/u echo as above. EF 70%  - Cardio eval appreciated     2. COVID19  - Known COVID 1 week ago, vaccinated, not hypoxic, no treatment  - maintain isolation.    3. CKD Stage 3 - stable    4. DVT ppx - PO Eliquis    Full Code     ~~ LM for daughter to call back for updates [phone]. d/c home. f/u cardio outpatient.

## 2021-04-09 NOTE — DISCHARGE NOTE NURSING/CASE MANAGEMENT/SOCIAL WORK - PATIENT PORTAL LINK FT
You can access the FollowMyHealth Patient Portal offered by [facility] by registering at the following website: [email or web address]. By joining [facility] [facility] [employer]’s FollowMyHealth portal, you will also be able to view your health information using other applications (apps) compatible with our system.

## 2021-04-16 DIAGNOSIS — Z85.850 PERSONAL HISTORY OF MALIGNANT NEOPLASM OF THYROID: ICD-10-CM

## 2021-04-16 DIAGNOSIS — E86.0 DEHYDRATION: ICD-10-CM

## 2021-04-16 DIAGNOSIS — I48.0 PAROXYSMAL ATRIAL FIBRILLATION: ICD-10-CM

## 2021-04-16 DIAGNOSIS — U07.1 COVID-19: ICD-10-CM

## 2021-04-16 DIAGNOSIS — H91.91 UNSPECIFIED HEARING LOSS, RIGHT EAR: ICD-10-CM

## 2021-04-16 DIAGNOSIS — N18.30 CHRONIC KIDNEY DISEASE, STAGE 3 UNSPECIFIED: ICD-10-CM

## 2021-04-16 DIAGNOSIS — E89.0 POSTPROCEDURAL HYPOTHYROIDISM: ICD-10-CM

## 2021-04-16 DIAGNOSIS — M81.0 AGE-RELATED OSTEOPOROSIS WITHOUT CURRENT PATHOLOGICAL FRACTURE: ICD-10-CM

## 2021-04-16 DIAGNOSIS — M19.90 UNSPECIFIED OSTEOARTHRITIS, UNSPECIFIED SITE: ICD-10-CM

## 2021-04-16 DIAGNOSIS — Z90.49 ACQUIRED ABSENCE OF OTHER SPECIFIED PARTS OF DIGESTIVE TRACT: ICD-10-CM

## 2021-06-30 ENCOUNTER — APPOINTMENT (OUTPATIENT)
Dept: UROGYNECOLOGY | Facility: CLINIC | Age: 81
End: 2021-06-30
Payer: MEDICARE

## 2021-06-30 DIAGNOSIS — R35.1 NOCTURIA: ICD-10-CM

## 2021-06-30 DIAGNOSIS — N39.0 URINARY TRACT INFECTION, SITE NOT SPECIFIED: ICD-10-CM

## 2021-06-30 DIAGNOSIS — R32 UNSPECIFIED URINARY INCONTINENCE: ICD-10-CM

## 2021-06-30 PROCEDURE — 52000 CYSTOURETHROSCOPY: CPT

## 2021-06-30 PROCEDURE — 99213 OFFICE O/P EST LOW 20 MIN: CPT | Mod: 25

## 2021-06-30 NOTE — PHYSICAL EXAM
[Chaperone Present] : A chaperone was present in the examining room during all aspects of the physical examination [Normal Appearance] : general appearance was normal [Aa ____] : Aa [unfilled] [Ba ____] : Ba [unfilled] [C ____] : C [unfilled] [GH ____] : GH [unfilled] [PB ____] : PB [unfilled] [TVL ____] : TVL  [unfilled] [Ap ____] : Ap [unfilled] [Bp ____] : Bp [unfilled] [D ____] : D [unfilled] [Absent] : absent [Normal] : no abnormalities [FreeTextEntry4] : no exposure

## 2021-06-30 NOTE — HISTORY OF PRESENT ILLNESS
[FreeTextEntry1] : About 7 months s/p TVH A+P+E repair, USLS, sling and bladder bx. Doing well. Here for cysto. Bx showed polyp at time of surgery. She feels she is emptying her bladder fully. No leaking. Moving bowels well. Got COVID in April 1 week after getting vaccine. Was hospitalized but is feeling better.  in hospital for 8 weeks but ok now.

## 2021-06-30 NOTE — DISCUSSION/SUMMARY
[FreeTextEntry1] : Doing well. Followup as needed. No further bladder surveillance necessary. Path ok. Prolaspe repair holding up well.

## 2021-09-08 NOTE — ED PROVIDER NOTE - NS_EDPROVIDERDISPOUSERTYPE_ED_A_ED
Detail Level: Simple Additional Notes: Patient consent was obtained to proceed with the visit and recommended plan of care after discussion of all risks and benefits, including the risks of COVID-19 exposure. Scribe Attestation (For Scribes USE Only)... Attending Attestation (For Attendings USE Only).../Scribe Attestation (For Scribes USE Only)...

## 2021-11-15 NOTE — ED PROVIDER NOTE - PSH
none
S/P arthroscopy of left knee  2016  S/P cholecystectomy  2015  S/P colonoscopy  unsure of all dates; last one 2016  S/P parathyroidectomy  12/2005  S/P thyroidectomy  12/2005

## 2021-12-13 NOTE — ASU DISCHARGE PLAN (ADULT/PEDIATRIC). - MEDICATION SUMMARY - MEDICATIONS TO STOP TAKING
-- DO NOT REPLY / DO NOT REPLY ALL --  -- Message is from the [facility]--    Patient is requesting a medication refill - medication is on active medication list    Patient is currently OUT of the requested medication.    Was Medication Pended?  Yes.    Rx Name and Dose:  divalproex (DEPAKOTE) 500 MG delayed release EC tablet    Duration: 30 days    Pharmacy  [id number] [address] [facility] [address]    Patient confirmed the above pharmacy as correct?  Yes    Does this request need an existing or new prescription at a pharmacy to be sent to a new pharmacy location?   No    Caller Information       Type Contact Phone    12/13/2021 12:56 PM CST Phone (Incoming) SANTIAGO TRINIDAD (Emergency Contact) [phone]          Alternative phone number: none    Turnaround time given to caller:   \"This message will be sent to [state Provider's name]. The clinical team will fulfill your request as soon as they review your message.\"   I will STOP taking the medications listed below when I get home from the hospital:  None

## 2021-12-21 NOTE — ASU PATIENT PROFILE, ADULT - NS TRANSFER HEARING AID
Patient Seen in: Immediate [facility]      History   Patient presents with:  Laceration/Abrasion    Stated Complaint: Laceration around 12pm- Head    Subjective: This is a 1year-old male with no significant past medical history.   Presents to [facility] acute distress. Appearance: Normal appearance. He is well-developed and normal weight. He is not toxic-appearing. HENT:      Head: Normocephalic. Laceration present. No cranial deformity, bony instability, tenderness, swelling or hematoma.  Hair is no intact: Yes  Tendon/Function intact: Yes  Dressing type: None  Td: Up-to-date  Pt tolerated: Well    DC home. Signs of wound infection discussed. Red flags of pediatric head injury also discussed. Tylenol and ibuprofen for pain.   Pediatrician follow-up Does not use.

## 2023-03-03 NOTE — ED PROVIDER NOTE - IV ALTEPLASE ADMIN OUTSIDE HIDDEN
Went to reassess pt, noted pt was sitting in a puddle that appeared to be thick whitish/yellow discharge, pt cleaned, new heart shape mepilex placed, catheter care redone.    Pt boosted, and warm blanket applied.    Pt did arrive with soft sole booties in place that came from her NH.       show

## 2023-03-10 ENCOUNTER — OFFICE (OUTPATIENT)
Dept: URBAN - METROPOLITAN AREA CLINIC 6 | Facility: CLINIC | Age: 83
Setting detail: OPHTHALMOLOGY
End: 2023-03-10
Payer: MEDICARE

## 2023-03-10 DIAGNOSIS — H25.13: ICD-10-CM

## 2023-03-10 DIAGNOSIS — H52.4: ICD-10-CM

## 2023-03-10 DIAGNOSIS — H43.813: ICD-10-CM

## 2023-03-10 DIAGNOSIS — H35.40: ICD-10-CM

## 2023-03-10 PROCEDURE — 92015 DETERMINE REFRACTIVE STATE: CPT | Performed by: OPHTHALMOLOGY

## 2023-03-10 PROCEDURE — 92014 COMPRE OPH EXAM EST PT 1/>: CPT | Performed by: OPHTHALMOLOGY

## 2023-03-10 ASSESSMENT — REFRACTION_CURRENTRX
OD_SPHERE: -0.75
OD_CYLINDER: SPHERE
OD_SPHERE: +1.50
OD_AXIS: 107
OD_VPRISM_DIRECTION: SV
OS_OVR_VA: 20/
OD_OVR_VA: 20/
OD_OVR_VA: 20/
OD_CYLINDER: SPHERE
OS_CYLINDER: -1.00
OD_VPRISM_DIRECTION: SV
OD_CYLINDER: -1.50
OS_OVR_VA: 20/
OS_VPRISM_DIRECTION: SV
OS_OVR_VA: 20/
OD_AXIS: 015
OS_SPHERE: -1.25
OD_OVR_VA: 20/
OS_VPRISM_DIRECTION: SV
OS_CYLINDER: SPHERE
OS_AXIS: 118
OS_SPHERE: +1.25
OD_SPHERE: +1.25

## 2023-03-10 ASSESSMENT — REFRACTION_MANIFEST
OD_VA1: 20/25
OU_VA: 20/30
OD_AXIS: 120
OD_VA1: 20/25
OS_SPHERE: -0.25
OD_ADD: +2.50
OS_CYLINDER: -1.50
OS_CYLINDER: -1.50
OD_SPHERE: +2.00
OS_ADD: +2.50
OS_ADD: +2.50
OS_VA1: 20/25-1
OS_VA1: 20/25-1
OS_AXIS: 115
OD_AXIS: 120
OD_SPHERE: +2.00
OD_ADD: +2.50
OS_SPHERE: -0.25
OS_AXIS: 115
OD_CYLINDER: -0.25
OU_VA: 20/30
OD_CYLINDER: -0.25

## 2023-03-10 ASSESSMENT — VISUAL ACUITY
OS_BCVA: 20/30-3
OD_BCVA: 20/25

## 2023-03-10 ASSESSMENT — SPHEQUIV_DERIVED
OD_SPHEQUIV: 1.875
OS_SPHEQUIV: -1.25
OD_SPHEQUIV: 1.875
OS_SPHEQUIV: -1
OD_SPHEQUIV: 2
OS_SPHEQUIV: -1

## 2023-03-10 ASSESSMENT — KERATOMETRY
OS_AXISANGLE_DEGREES: 101
OD_K1POWER_DIOPTERS: 43.50
OS_K1POWER_DIOPTERS: 43.50
OD_K2POWER_DIOPTERS: 44.00
METHOD_AUTO_MANUAL: AUTO
OD_AXISANGLE_DEGREES: 042
OS_K2POWER_DIOPTERS: 43.75

## 2023-03-10 ASSESSMENT — REFRACTION_AUTOREFRACTION
OS_AXIS: 113
OD_CYLINDER: -1.00
OS_CYLINDER: -1.50
OD_AXIS: 118
OS_SPHERE: -0.50
OD_SPHERE: +2.50

## 2023-03-10 ASSESSMENT — CONFRONTATIONAL VISUAL FIELD TEST (CVF)
OD_FINDINGS: FULL
OS_FINDINGS: FULL

## 2023-03-10 ASSESSMENT — TONOMETRY
OD_IOP_MMHG: 19
OS_IOP_MMHG: 19

## 2023-03-10 ASSESSMENT — AXIALLENGTH_DERIVED
OD_AL: 22.7973
OS_AL: 23.9407
OS_AL: 23.9407
OD_AL: 22.7973
OD_AL: 22.7519
OS_AL: 24.0414

## 2023-07-05 RX ORDER — VERAPAMIL HCL 240 MG
1 CAPSULE, EXTENDED RELEASE PELLETS 24 HR ORAL
Qty: 0 | Refills: 0 | DISCHARGE

## 2023-07-06 ENCOUNTER — OUTPATIENT (OUTPATIENT)
Dept: OUTPATIENT SERVICES | Facility: HOSPITAL | Age: 83
LOS: 1 days | End: 2023-07-06
Payer: MEDICARE

## 2023-07-06 ENCOUNTER — OUTPATIENT (OUTPATIENT)
Dept: OUTPATIENT SERVICES | Facility: HOSPITAL | Age: 83
LOS: 1 days | Discharge: HOME | End: 2023-07-06
Payer: MEDICARE

## 2023-07-06 VITALS
DIASTOLIC BLOOD PRESSURE: 64 MMHG | RESPIRATION RATE: 16 BRPM | OXYGEN SATURATION: 96 % | SYSTOLIC BLOOD PRESSURE: 106 MMHG | HEART RATE: 62 BPM

## 2023-07-06 VITALS
HEART RATE: 148 BPM | SYSTOLIC BLOOD PRESSURE: 136 MMHG | RESPIRATION RATE: 16 BRPM | DIASTOLIC BLOOD PRESSURE: 84 MMHG | WEIGHT: 144.84 LBS | OXYGEN SATURATION: 98 % | TEMPERATURE: 98 F

## 2023-07-06 DIAGNOSIS — Z98.890 OTHER SPECIFIED POSTPROCEDURAL STATES: Chronic | ICD-10-CM

## 2023-07-06 DIAGNOSIS — E89.0 POSTPROCEDURAL HYPOTHYROIDISM: Chronic | ICD-10-CM

## 2023-07-06 DIAGNOSIS — I48.19 OTHER PERSISTENT ATRIAL FIBRILLATION: ICD-10-CM

## 2023-07-06 DIAGNOSIS — I48.91 UNSPECIFIED ATRIAL FIBRILLATION: ICD-10-CM

## 2023-07-06 DIAGNOSIS — E89.2 POSTPROCEDURAL HYPOPARATHYROIDISM: Chronic | ICD-10-CM

## 2023-07-06 DIAGNOSIS — Z90.49 ACQUIRED ABSENCE OF OTHER SPECIFIED PARTS OF DIGESTIVE TRACT: Chronic | ICD-10-CM

## 2023-07-06 LAB
ANION GAP SERPL CALC-SCNC: 6 MMOL/L — SIGNIFICANT CHANGE UP (ref 5–17)
BUN SERPL-MCNC: 22 MG/DL — SIGNIFICANT CHANGE UP (ref 7–23)
CALCIUM SERPL-MCNC: 8.7 MG/DL — SIGNIFICANT CHANGE UP (ref 8.5–10.1)
CHLORIDE SERPL-SCNC: 112 MMOL/L — HIGH (ref 96–108)
CO2 SERPL-SCNC: 24 MMOL/L — SIGNIFICANT CHANGE UP (ref 22–31)
CREAT SERPL-MCNC: 1.2 MG/DL — SIGNIFICANT CHANGE UP (ref 0.5–1.3)
EGFR: 45 ML/MIN/1.73M2 — LOW
GLUCOSE SERPL-MCNC: 90 MG/DL — SIGNIFICANT CHANGE UP (ref 70–99)
HCT VFR BLD CALC: 42.4 % — SIGNIFICANT CHANGE UP (ref 34.5–45)
HGB BLD-MCNC: 14.5 G/DL — SIGNIFICANT CHANGE UP (ref 11.5–15.5)
MAGNESIUM SERPL-MCNC: 2.5 MG/DL — SIGNIFICANT CHANGE UP (ref 1.6–2.6)
MCHC RBC-ENTMCNC: 30.5 PG — SIGNIFICANT CHANGE UP (ref 27–34)
MCHC RBC-ENTMCNC: 34.2 GM/DL — SIGNIFICANT CHANGE UP (ref 32–36)
MCV RBC AUTO: 89.3 FL — SIGNIFICANT CHANGE UP (ref 80–100)
PLATELET # BLD AUTO: 174 K/UL — SIGNIFICANT CHANGE UP (ref 150–400)
POTASSIUM SERPL-MCNC: 4.1 MMOL/L — SIGNIFICANT CHANGE UP (ref 3.5–5.3)
POTASSIUM SERPL-SCNC: 4.1 MMOL/L — SIGNIFICANT CHANGE UP (ref 3.5–5.3)
RBC # BLD: 4.75 M/UL — SIGNIFICANT CHANGE UP (ref 3.8–5.2)
RBC # FLD: 12.9 % — SIGNIFICANT CHANGE UP (ref 10.3–14.5)
SODIUM SERPL-SCNC: 142 MMOL/L — SIGNIFICANT CHANGE UP (ref 135–145)
WBC # BLD: 5.69 K/UL — SIGNIFICANT CHANGE UP (ref 3.8–10.5)
WBC # FLD AUTO: 5.69 K/UL — SIGNIFICANT CHANGE UP (ref 3.8–10.5)

## 2023-07-06 PROCEDURE — 85027 COMPLETE CBC AUTOMATED: CPT

## 2023-07-06 PROCEDURE — 36415 COLL VENOUS BLD VENIPUNCTURE: CPT

## 2023-07-06 PROCEDURE — 83735 ASSAY OF MAGNESIUM: CPT

## 2023-07-06 PROCEDURE — 92960 CARDIOVERSION ELECTRIC EXT: CPT

## 2023-07-06 PROCEDURE — 93005 ELECTROCARDIOGRAM TRACING: CPT

## 2023-07-06 PROCEDURE — 93010 ELECTROCARDIOGRAM REPORT: CPT | Mod: 77

## 2023-07-06 PROCEDURE — 93010 ELECTROCARDIOGRAM REPORT: CPT

## 2023-07-06 PROCEDURE — 80048 BASIC METABOLIC PNL TOTAL CA: CPT

## 2023-07-06 RX ORDER — DRONEDARONE 400 MG/1
1 TABLET, FILM COATED ORAL
Qty: 60 | Refills: 3
Start: 2023-07-06 | End: 2023-11-02

## 2023-07-06 RX ORDER — DRONEDARONE 400 MG/1
400 TABLET, FILM COATED ORAL ONCE
Refills: 0 | Status: COMPLETED | OUTPATIENT
Start: 2023-07-06 | End: 2023-07-06

## 2023-07-06 RX ORDER — VERAPAMIL HYDROCHLORIDE 120 MG/1
120 CAPSULE, EXTENDED RELEASE ORAL
Refills: 0 | Status: DISCONTINUED | COMMUNITY
End: 2023-07-06

## 2023-07-06 RX ORDER — APIXABAN 2.5 MG/1
1 TABLET, FILM COATED ORAL
Refills: 0 | DISCHARGE

## 2023-07-06 RX ORDER — DILTIAZEM HCL 120 MG
1 CAPSULE, EXT RELEASE 24 HR ORAL
Refills: 0 | DISCHARGE

## 2023-07-06 RX ADMIN — DRONEDARONE 400 MILLIGRAM(S): 400 TABLET, FILM COATED ORAL at 13:08

## 2023-07-06 NOTE — PROCEDURE NOTE - ADDITIONAL PROCEDURE DETAILS
post op orders  SR narrow QRS 60s bpm, QTc 440ms   stop cardizem   start multaq 400mg bid   cont OAC  follow up for ecg check in 1 week

## 2023-07-07 DIAGNOSIS — I28.1 ANEURYSM OF PULMONARY ARTERY: ICD-10-CM

## 2023-07-07 RX ORDER — DRONEDARONE 400 MG/1
1 TABLET, FILM COATED ORAL
Qty: 60 | Refills: 0
Start: 2023-07-07

## 2023-07-08 DIAGNOSIS — I28.1 ANEURYSM OF PULMONARY ARTERY: ICD-10-CM

## 2023-08-28 ENCOUNTER — OUTPATIENT (OUTPATIENT)
Dept: OUTPATIENT SERVICES | Facility: HOSPITAL | Age: 83
LOS: 1 days | End: 2023-08-28
Payer: MEDICARE

## 2023-08-28 DIAGNOSIS — Z98.890 OTHER SPECIFIED POSTPROCEDURAL STATES: Chronic | ICD-10-CM

## 2023-08-28 DIAGNOSIS — E89.0 POSTPROCEDURAL HYPOTHYROIDISM: Chronic | ICD-10-CM

## 2023-08-28 DIAGNOSIS — E89.2 POSTPROCEDURAL HYPOPARATHYROIDISM: Chronic | ICD-10-CM

## 2023-08-28 DIAGNOSIS — Z90.49 ACQUIRED ABSENCE OF OTHER SPECIFIED PARTS OF DIGESTIVE TRACT: Chronic | ICD-10-CM

## 2023-08-28 PROCEDURE — 85027 COMPLETE CBC AUTOMATED: CPT

## 2023-08-28 PROCEDURE — 92960 CARDIOVERSION ELECTRIC EXT: CPT

## 2023-08-28 PROCEDURE — 36415 COLL VENOUS BLD VENIPUNCTURE: CPT

## 2023-08-28 PROCEDURE — 83735 ASSAY OF MAGNESIUM: CPT

## 2023-08-28 PROCEDURE — 93005 ELECTROCARDIOGRAM TRACING: CPT | Mod: XU

## 2023-08-28 PROCEDURE — 80048 BASIC METABOLIC PNL TOTAL CA: CPT

## 2023-08-28 RX ORDER — DENOSUMAB 60 MG/ML
0 INJECTION SUBCUTANEOUS
Qty: 0 | Refills: 0 | DISCHARGE

## 2023-08-28 RX ORDER — LEVOTHYROXINE SODIUM 125 MCG
1 TABLET ORAL
Qty: 0 | Refills: 0 | DISCHARGE

## 2023-08-29 DIAGNOSIS — I48.19 OTHER PERSISTENT ATRIAL FIBRILLATION: ICD-10-CM

## 2023-08-30 DIAGNOSIS — I48.19 OTHER PERSISTENT ATRIAL FIBRILLATION: ICD-10-CM

## 2023-08-30 NOTE — POST DISCHARGE NOTE - DETAILS:
Post procedure phone call completed; patient understood all discharge paperwork. No questions regarding medications or pain management. MD follow up appointment made. Patient was able to rest when they were discharged. Patient will recommend [facility], no complaints of hospital stay, satisfied with care. Instructed patient to contact provider with any further questions or concerns.

## 2023-10-30 NOTE — H&P PST ADULT - NSANTHOSAYNRD_GEN_A_CORE
1.07
No. RBOYN screening performed.  STOP BANG Legend: 0-2 = LOW Risk; 3-4 = INTERMEDIATE Risk; 5-8 = HIGH Risk

## 2023-12-12 NOTE — H&P PST ADULT - NSICDXPASTSURGICALHX_GEN_ALL_CORE_FT
Silver Solders  1947  [id number]    HISTORY OF PRESENT ILLNESS: Ms. Daly Hicks is a 64 y.o. female returns for a follow up visit for pain management  She has a diagnosis of   1. Chronic pain syndrome    2. Arthritis of right shoulder region    3. Cervical arthritis    4. DDD (degenerative disc disease), thoracic    5. Facet arthropathy, thoracic    6. Lumbosacral spondylosis without myelopathy    7. Chronic midline low back pain with sciatica, sciatica laterality unspecified    8. Spinal stenosis of lumbar region without neurogenic claudication    9. Other secondary osteoarthritis of multiple sites    10. COPD exacerbation ([address])      As per Information Obtained from the PADT (Patient Assessment and Documentation Tool)    She complains of pain in the  lower back, right shoulder  She rates the pain 7/10 and describes it as aching. Current treatment regimen has helped relieve about 40% of the pain. She has sweating  any side effects from the current pain regimen. Patient reports that since the last follow up visit the physical functioning is unchanged, family/social relationships are unchanged, mood is unchanged sleep patterns are unchanged, and that the overall functioning is unchanged. Patient denies misusing/abusing her narcotic pain medications or using any illegal drugs. Upon obtaining medical history from Ms. Hernandes states that pain is somewhat manageable on current pain therapy. Takes the pain medications as prescribed. Still has some pain with activities mainly to the right shoulder, requesting for injection today. Mood/anxiety is stable. Sleep is fair with an average of 5-6 hours. Denies to having issues of constipation. Tolerating activities/house chores with moderate tenderness to the lower back. ALLERGIES: Patients list of allergies were reviewed     MEDICATIONS: Ms. Daly Hicks list of medications were reviewed. Her current medications are   Outpatient Medications Prior to Visit   Medication Sig Dispense Refill supplemental O2 PAST SURGICAL HISTORY:  S/P arthroscopy of left knee 2016    S/P cholecystectomy 2015    S/P colonoscopy unsure of all dates; last one 2016    S/P parathyroidectomy 12/2005    S/P thyroidectomy 12/2005

## 2024-03-22 ENCOUNTER — OFFICE (OUTPATIENT)
Dept: URBAN - METROPOLITAN AREA CLINIC 6 | Facility: CLINIC | Age: 84
Setting detail: OPHTHALMOLOGY
End: 2024-03-22
Payer: MEDICARE

## 2024-03-22 VITALS — HEIGHT: 55 IN

## 2024-03-22 DIAGNOSIS — H43.813: ICD-10-CM

## 2024-03-22 DIAGNOSIS — H25.13: ICD-10-CM

## 2024-03-22 DIAGNOSIS — H35.40: ICD-10-CM

## 2024-03-22 PROCEDURE — 92014 COMPRE OPH EXAM EST PT 1/>: CPT | Performed by: OPHTHALMOLOGY

## 2024-03-22 ASSESSMENT — REFRACTION_MANIFEST
OD_CYLINDER: -1.25
OU_VA: 20/20-1
OS_AXIS: 115
OS_SPHERE: -0.25
OD_ADD: +2.75
OU_VA: 20/30
OD_ADD: +2.50
OS_ADD: +2.75
OS_ADD: +2.50
OS_CYLINDER: -1.25
OS_CYLINDER: -1.50
OD_SPHERE: +2.00
OD_VA2: 20/25(J1)
OS_VA1: 20/25-1
OD_AXIS: 120
OS_VA2: 20/25(J1)
OS_AXIS: 110
OD_VA1: 20/20-2
OD_AXIS: 120
OD_CYLINDER: -0.25
OD_VA1: 20/25
OD_SPHERE: +2.50
OS_VA1: 20/25-2
OS_SPHERE: -0.25

## 2024-03-22 ASSESSMENT — REFRACTION_CURRENTRX
OD_CYLINDER: SPH
OD_OVR_VA: 20/
OS_OVR_VA: 20/
OS_OVR_VA: 20/
OS_CYLINDER: SPH
OD_OVR_VA: 20/
OS_SPHERE: -1.25
OD_VPRISM_DIRECTION: SV
OD_CYLINDER: SPH
OS_AXIS: 120
OD_SPHERE: +1.25
OS_CYLINDER: -1.00
OD_SPHERE: +1.00
OS_SPHERE: +1.25
OS_OVR_VA: 20/
OS_VPRISM_DIRECTION: SV
OD_OVR_VA: 20/

## 2024-03-22 ASSESSMENT — SPHEQUIV_DERIVED
OS_SPHEQUIV: -0.875
OD_SPHEQUIV: 1.875
OD_SPHEQUIV: 1.875
OS_SPHEQUIV: -1

## 2024-05-20 NOTE — ASU PATIENT PROFILE, ADULT - HEALTH/HEALTHCARE ANXIETIES, PROFILE
Low vitamin D, elevated cholesterol: 2000 units vitamin D/day, low fat, healthy diet/ exercise/ weight loss. Repeat in 6 months
denies

## 2024-06-20 NOTE — ASU PREOP CHECKLIST - SKIN PREP
Quality 128: Preventive Care And Screening: Body Mass Index (Bmi) Screening And Follow-Up Plan: BMI is documented within normal parameters and no follow-up plan is required.
Detail Level: Detailed
done/theron

## 2025-02-13 NOTE — ED ADULT NURSE NOTE - INTEGUMENTARY WDL
Last office visit: Inpatient consult with Dr. Jj on 8/8/24     Next scheduled: 05/29/2025 with Dr. Garcia     Medication/dose: metoPROLOL succinate (TOPROL-XL) 50 MG 24 hr tablet Take 1 tablet by mouth daily. - Oral      Quantity/#refills: 30/6     Medication related testing: N/A     Refill request completed? Yes         Color consistent with ethnicity/race, warm, dry intact, resilient.

## 2025-03-14 ENCOUNTER — OFFICE (OUTPATIENT)
Dept: URBAN - METROPOLITAN AREA CLINIC 6 | Facility: CLINIC | Age: 85
Setting detail: OPHTHALMOLOGY
End: 2025-03-14
Payer: MEDICARE

## 2025-03-14 DIAGNOSIS — H43.813: ICD-10-CM

## 2025-03-14 DIAGNOSIS — H35.40: ICD-10-CM

## 2025-03-14 DIAGNOSIS — H52.7: ICD-10-CM

## 2025-03-14 DIAGNOSIS — H25.13: ICD-10-CM

## 2025-03-14 PROCEDURE — 92015 DETERMINE REFRACTIVE STATE: CPT | Performed by: OPHTHALMOLOGY

## 2025-03-14 PROCEDURE — 92014 COMPRE OPH EXAM EST PT 1/>: CPT | Performed by: OPHTHALMOLOGY

## 2025-03-14 ASSESSMENT — REFRACTION_CURRENTRX
OD_CYLINDER: SPH
OS_VPRISM_DIRECTION: SV
OD_SPHERE: +1.25
OS_SPHERE: +1.25
OS_OVR_VA: 20/
OS_CYLINDER: SPH
OD_CYLINDER: SPH
OD_OVR_VA: 20/
OD_OVR_VA: 20/
OD_VPRISM_DIRECTION: SV
OS_SPHERE: -1.00
OS_AXIS: 125
OD_SPHERE: +1.00
OS_VPRISM_DIRECTION: SV
OS_OVR_VA: 20/
OS_CYLINDER: -1.00
OD_VPRISM_DIRECTION: SV

## 2025-03-14 ASSESSMENT — CONFRONTATIONAL VISUAL FIELD TEST (CVF)
OD_FINDINGS: FULL
OS_FINDINGS: FULL

## 2025-03-14 ASSESSMENT — REFRACTION_MANIFEST
OD_SPHERE: +2.75
OS_CYLINDER: -1.50
OS_SPHERE: -0.50
OS_CYLINDER: -1.50
OS_AXIS: 100
OD_VA1: 20/20-1
OS_VA1: 20/25-1
OD_SPHERE: +2.75
OD_CYLINDER: -0.50
OS_ADD: +2.75
OD_VA1: 20/20-1
OD_CYLINDER: -0.50
OS_ADD: +2.75
OD_AXIS: 115
OS_AXIS: 100
OD_AXIS: 115
OU_VA: 20/20-1
OS_SPHERE: -0.50
OD_ADD: +2.75
OS_VA1: 20/25-1
OU_VA: 20/20-1
OD_VA2: 20/25(J1)
OD_ADD: +2.75
OD_VA2: 20/25(J1)
OS_VA2: 20/25(J1)
OS_VA2: 20/25(J1)

## 2025-03-14 ASSESSMENT — REFRACTION_AUTOREFRACTION
OD_AXIS: 115
OS_SPHERE: PLANO
OD_SPHERE: +3.00
OS_CYLINDER: -1.50
OD_CYLINDER: -1.00
OS_AXIS: 100

## 2025-03-14 ASSESSMENT — KERATOMETRY
OS_K2POWER_DIOPTERS: 43.75
OS_K1POWER_DIOPTERS: 43.50
OS_AXISANGLE_DEGREES: 045
OD_K2POWER_DIOPTERS: 44.00
OD_K1POWER_DIOPTERS: 43.50
METHOD_AUTO_MANUAL: AUTO
OD_AXISANGLE_DEGREES: 154

## 2025-03-14 ASSESSMENT — TONOMETRY
OS_IOP_MMHG: 17
OD_IOP_MMHG: 17

## 2025-03-14 ASSESSMENT — VISUAL ACUITY
OD_BCVA: 20/40-1
OS_BCVA: 20/40-2